# Patient Record
Sex: FEMALE | Race: OTHER | Employment: UNEMPLOYED | URBAN - METROPOLITAN AREA
[De-identification: names, ages, dates, MRNs, and addresses within clinical notes are randomized per-mention and may not be internally consistent; named-entity substitution may affect disease eponyms.]

---

## 2020-01-11 ENCOUNTER — HOSPITAL ENCOUNTER (INPATIENT)
Age: 55
LOS: 3 days | Discharge: NURSING, REHAB OR LONG-TERM CARE | DRG: 463 | End: 2020-01-14
Attending: FAMILY MEDICINE | Admitting: FAMILY MEDICINE
Payer: MEDICARE

## 2020-01-11 PROBLEM — D50.9 IRON DEFICIENCY ANEMIA: Status: ACTIVE | Noted: 2020-01-11

## 2020-01-11 PROBLEM — N12 PYELONEPHRITIS: Status: ACTIVE | Noted: 2020-01-11

## 2020-01-11 PROBLEM — D75.839 THROMBOCYTOSIS: Status: ACTIVE | Noted: 2020-01-11

## 2020-01-11 PROBLEM — A41.9 SEPSIS (HCC): Status: ACTIVE | Noted: 2020-01-11

## 2020-01-11 PROBLEM — N15.1 KIDNEY ABSCESS: Status: ACTIVE | Noted: 2020-01-11

## 2020-01-11 PROBLEM — Z87.891 EX-SMOKER: Status: ACTIVE | Noted: 2020-01-11

## 2020-01-11 PROBLEM — E11.9 TYPE 2 DIABETES MELLITUS WITHOUT COMPLICATION, WITHOUT LONG-TERM CURRENT USE OF INSULIN (HCC): Status: ACTIVE | Noted: 2020-01-11

## 2020-01-11 LAB
ABSOLUTE EOS #: 0.1 K/UL (ref 0–0.44)
ABSOLUTE IMMATURE GRANULOCYTE: 0.05 K/UL (ref 0–0.3)
ABSOLUTE LYMPH #: 2.01 K/UL (ref 1.1–3.7)
ABSOLUTE MONO #: 0.55 K/UL (ref 0.1–1.2)
ALBUMIN SERPL-MCNC: 2.9 G/DL (ref 3.5–5.2)
ALBUMIN/GLOBULIN RATIO: 0.6 (ref 1–2.5)
ALP BLD-CCNC: 85 U/L (ref 35–104)
ALT SERPL-CCNC: 14 U/L (ref 5–33)
ANION GAP SERPL CALCULATED.3IONS-SCNC: 12 MMOL/L (ref 9–17)
AST SERPL-CCNC: 13 U/L
BASOPHILS # BLD: 1 % (ref 0–2)
BASOPHILS ABSOLUTE: 0.04 K/UL (ref 0–0.2)
BILIRUB SERPL-MCNC: 0.35 MG/DL (ref 0.3–1.2)
BUN BLDV-MCNC: 4 MG/DL (ref 6–20)
BUN/CREAT BLD: ABNORMAL (ref 9–20)
CALCIUM IONIZED: 1.16 MMOL/L (ref 1.13–1.33)
CALCIUM SERPL-MCNC: 8.7 MG/DL (ref 8.6–10.4)
CHLORIDE BLD-SCNC: 97 MMOL/L (ref 98–107)
CO2: 27 MMOL/L (ref 20–31)
CREAT SERPL-MCNC: 0.51 MG/DL (ref 0.5–0.9)
DIFFERENTIAL TYPE: ABNORMAL
EOSINOPHILS RELATIVE PERCENT: 2 % (ref 1–4)
GFR AFRICAN AMERICAN: >60 ML/MIN
GFR NON-AFRICAN AMERICAN: >60 ML/MIN
GFR SERPL CREATININE-BSD FRML MDRD: ABNORMAL ML/MIN/{1.73_M2}
GFR SERPL CREATININE-BSD FRML MDRD: ABNORMAL ML/MIN/{1.73_M2}
GLUCOSE BLD-MCNC: 145 MG/DL (ref 65–105)
GLUCOSE BLD-MCNC: 149 MG/DL (ref 70–99)
GLUCOSE BLD-MCNC: 239 MG/DL (ref 65–105)
GLUCOSE BLD-MCNC: 249 MG/DL (ref 65–105)
HCT VFR BLD CALC: 28.5 % (ref 36.3–47.1)
HEMOGLOBIN: 8.7 G/DL (ref 11.9–15.1)
IMMATURE GRANULOCYTES: 1 %
INR BLD: 1.1
IRON SATURATION: 12 % (ref 20–55)
IRON: 27 UG/DL (ref 37–145)
LACTIC ACID, SEPSIS WHOLE BLOOD: 1.1 MMOL/L (ref 0.5–1.9)
LACTIC ACID, SEPSIS WHOLE BLOOD: 1.3 MMOL/L (ref 0.5–1.9)
LACTIC ACID, SEPSIS: NORMAL MMOL/L (ref 0.5–1.9)
LACTIC ACID, SEPSIS: NORMAL MMOL/L (ref 0.5–1.9)
LYMPHOCYTES # BLD: 29 % (ref 24–43)
MAGNESIUM: 1.6 MG/DL (ref 1.6–2.6)
MCH RBC QN AUTO: 25.4 PG (ref 25.2–33.5)
MCHC RBC AUTO-ENTMCNC: 30.5 G/DL (ref 28.4–34.8)
MCV RBC AUTO: 83.1 FL (ref 82.6–102.9)
MONOCYTES # BLD: 8 % (ref 3–12)
NRBC AUTOMATED: 0 PER 100 WBC
PDW BLD-RTO: 15.2 % (ref 11.8–14.4)
PHOSPHORUS: 3.4 MG/DL (ref 2.6–4.5)
PLATELET # BLD: 379 K/UL (ref 138–453)
PLATELET ESTIMATE: ABNORMAL
PMV BLD AUTO: 8.6 FL (ref 8.1–13.5)
POTASSIUM SERPL-SCNC: 3.9 MMOL/L (ref 3.7–5.3)
PROTHROMBIN TIME: 12 SEC (ref 9–12)
RBC # BLD: 3.43 M/UL (ref 3.95–5.11)
RBC # BLD: ABNORMAL 10*6/UL
SEG NEUTROPHILS: 59 % (ref 36–65)
SEGMENTED NEUTROPHILS ABSOLUTE COUNT: 4.1 K/UL (ref 1.5–8.1)
SODIUM BLD-SCNC: 136 MMOL/L (ref 135–144)
TOTAL IRON BINDING CAPACITY: 229 UG/DL (ref 250–450)
TOTAL PROTEIN: 7.9 G/DL (ref 6.4–8.3)
UNSATURATED IRON BINDING CAPACITY: 202 UG/DL (ref 112–347)
WBC # BLD: 6.9 K/UL (ref 3.5–11.3)
WBC # BLD: ABNORMAL 10*3/UL

## 2020-01-11 PROCEDURE — 2060000000 HC ICU INTERMEDIATE R&B

## 2020-01-11 PROCEDURE — 85610 PROTHROMBIN TIME: CPT

## 2020-01-11 PROCEDURE — 2580000003 HC RX 258: Performed by: NURSE PRACTITIONER

## 2020-01-11 PROCEDURE — 85025 COMPLETE CBC W/AUTO DIFF WBC: CPT

## 2020-01-11 PROCEDURE — 6370000000 HC RX 637 (ALT 250 FOR IP): Performed by: NURSE PRACTITIONER

## 2020-01-11 PROCEDURE — 87040 BLOOD CULTURE FOR BACTERIA: CPT

## 2020-01-11 PROCEDURE — 36415 COLL VENOUS BLD VENIPUNCTURE: CPT

## 2020-01-11 PROCEDURE — 80053 COMPREHEN METABOLIC PANEL: CPT

## 2020-01-11 PROCEDURE — 82947 ASSAY GLUCOSE BLOOD QUANT: CPT

## 2020-01-11 PROCEDURE — 99223 1ST HOSP IP/OBS HIGH 75: CPT | Performed by: FAMILY MEDICINE

## 2020-01-11 PROCEDURE — 83550 IRON BINDING TEST: CPT

## 2020-01-11 PROCEDURE — 6360000002 HC RX W HCPCS: Performed by: NURSE PRACTITIONER

## 2020-01-11 PROCEDURE — 84100 ASSAY OF PHOSPHORUS: CPT

## 2020-01-11 PROCEDURE — 83605 ASSAY OF LACTIC ACID: CPT

## 2020-01-11 PROCEDURE — 83735 ASSAY OF MAGNESIUM: CPT

## 2020-01-11 PROCEDURE — 83540 ASSAY OF IRON: CPT

## 2020-01-11 PROCEDURE — 80074 ACUTE HEPATITIS PANEL: CPT

## 2020-01-11 PROCEDURE — 82330 ASSAY OF CALCIUM: CPT

## 2020-01-11 PROCEDURE — 6370000000 HC RX 637 (ALT 250 FOR IP): Performed by: FAMILY MEDICINE

## 2020-01-11 RX ORDER — GABAPENTIN 300 MG/1
300 CAPSULE ORAL
COMMUNITY
Start: 2020-01-08

## 2020-01-11 RX ORDER — BUSPIRONE HYDROCHLORIDE 15 MG/1
15 TABLET ORAL NIGHTLY
Status: DISCONTINUED | OUTPATIENT
Start: 2020-01-11 | End: 2020-01-15 | Stop reason: HOSPADM

## 2020-01-11 RX ORDER — DEXTROSE MONOHYDRATE 50 MG/ML
100 INJECTION, SOLUTION INTRAVENOUS PRN
Status: DISCONTINUED | OUTPATIENT
Start: 2020-01-11 | End: 2020-01-15 | Stop reason: HOSPADM

## 2020-01-11 RX ORDER — PANTOPRAZOLE SODIUM 40 MG/1
40 TABLET, DELAYED RELEASE ORAL
Status: DISCONTINUED | OUTPATIENT
Start: 2020-01-12 | End: 2020-01-15 | Stop reason: HOSPADM

## 2020-01-11 RX ORDER — SODIUM CHLORIDE 0.9 % (FLUSH) 0.9 %
10 SYRINGE (ML) INJECTION EVERY 12 HOURS SCHEDULED
Status: DISCONTINUED | OUTPATIENT
Start: 2020-01-11 | End: 2020-01-15 | Stop reason: HOSPADM

## 2020-01-11 RX ORDER — OMEPRAZOLE 20 MG/1
20 CAPSULE, DELAYED RELEASE ORAL DAILY
COMMUNITY
Start: 2019-09-25

## 2020-01-11 RX ORDER — GABAPENTIN 300 MG/1
300 CAPSULE ORAL 3 TIMES DAILY
Status: DISCONTINUED | OUTPATIENT
Start: 2020-01-11 | End: 2020-01-15 | Stop reason: HOSPADM

## 2020-01-11 RX ORDER — ASPIRIN 81 MG/1
81 TABLET, CHEWABLE ORAL DAILY
Status: DISCONTINUED | OUTPATIENT
Start: 2020-01-11 | End: 2020-01-15 | Stop reason: HOSPADM

## 2020-01-11 RX ORDER — DEXTROSE MONOHYDRATE 25 G/50ML
12.5 INJECTION, SOLUTION INTRAVENOUS PRN
Status: DISCONTINUED | OUTPATIENT
Start: 2020-01-11 | End: 2020-01-15 | Stop reason: HOSPADM

## 2020-01-11 RX ORDER — DEXTROSE MONOHYDRATE 50 MG/ML
100 INJECTION, SOLUTION INTRAVENOUS PRN
Status: DISCONTINUED | OUTPATIENT
Start: 2020-01-11 | End: 2020-01-11 | Stop reason: SDUPTHER

## 2020-01-11 RX ORDER — BUPROPION HYDROCHLORIDE 150 MG/1
150 TABLET, EXTENDED RELEASE ORAL DAILY
Status: DISCONTINUED | OUTPATIENT
Start: 2020-01-11 | End: 2020-01-15 | Stop reason: HOSPADM

## 2020-01-11 RX ORDER — FLUOXETINE HYDROCHLORIDE 20 MG/1
40 CAPSULE ORAL DAILY
Status: DISCONTINUED | OUTPATIENT
Start: 2020-01-11 | End: 2020-01-15 | Stop reason: HOSPADM

## 2020-01-11 RX ORDER — FLUOXETINE HYDROCHLORIDE 40 MG/1
CAPSULE ORAL
COMMUNITY
Start: 2019-08-05

## 2020-01-11 RX ORDER — INSULIN GLARGINE 100 [IU]/ML
20 INJECTION, SOLUTION SUBCUTANEOUS ONCE
Status: COMPLETED | OUTPATIENT
Start: 2020-01-11 | End: 2020-01-11

## 2020-01-11 RX ORDER — INSULIN GLARGINE 100 [IU]/ML
40 INJECTION, SOLUTION SUBCUTANEOUS 2 TIMES DAILY
Status: DISCONTINUED | OUTPATIENT
Start: 2020-01-11 | End: 2020-01-15 | Stop reason: HOSPADM

## 2020-01-11 RX ORDER — LISINOPRIL AND HYDROCHLOROTHIAZIDE 12.5; 1 MG/1; MG/1
1 TABLET ORAL DAILY
Status: DISCONTINUED | OUTPATIENT
Start: 2020-01-11 | End: 2020-01-15 | Stop reason: HOSPADM

## 2020-01-11 RX ORDER — ACETAMINOPHEN 325 MG/1
650 TABLET ORAL EVERY 4 HOURS PRN
Status: DISCONTINUED | OUTPATIENT
Start: 2020-01-11 | End: 2020-01-11

## 2020-01-11 RX ORDER — SODIUM CHLORIDE 0.9 % (FLUSH) 0.9 %
10 SYRINGE (ML) INJECTION PRN
Status: DISCONTINUED | OUTPATIENT
Start: 2020-01-11 | End: 2020-01-15 | Stop reason: HOSPADM

## 2020-01-11 RX ORDER — OXYCODONE HYDROCHLORIDE AND ACETAMINOPHEN 5; 325 MG/1; MG/1
1 TABLET ORAL
COMMUNITY
Start: 2020-01-10

## 2020-01-11 RX ORDER — OXYCODONE HYDROCHLORIDE AND ACETAMINOPHEN 5; 325 MG/1; MG/1
1 TABLET ORAL EVERY 4 HOURS PRN
Status: DISCONTINUED | OUTPATIENT
Start: 2020-01-11 | End: 2020-01-15 | Stop reason: HOSPADM

## 2020-01-11 RX ORDER — AMITRIPTYLINE HYDROCHLORIDE 25 MG/1
25 TABLET, FILM COATED ORAL
COMMUNITY
Start: 2019-12-24 | End: 2020-01-23

## 2020-01-11 RX ORDER — NICOTINE POLACRILEX 4 MG
15 LOZENGE BUCCAL PRN
Status: DISCONTINUED | OUTPATIENT
Start: 2020-01-11 | End: 2020-01-15 | Stop reason: HOSPADM

## 2020-01-11 RX ORDER — AMITRIPTYLINE HYDROCHLORIDE 25 MG/1
25 TABLET, FILM COATED ORAL NIGHTLY
Status: DISCONTINUED | OUTPATIENT
Start: 2020-01-11 | End: 2020-01-15 | Stop reason: HOSPADM

## 2020-01-11 RX ORDER — NICOTINE POLACRILEX 4 MG
15 LOZENGE BUCCAL PRN
Status: DISCONTINUED | OUTPATIENT
Start: 2020-01-11 | End: 2020-01-11 | Stop reason: SDUPTHER

## 2020-01-11 RX ORDER — DEXTROSE MONOHYDRATE 25 G/50ML
12.5 INJECTION, SOLUTION INTRAVENOUS PRN
Status: DISCONTINUED | OUTPATIENT
Start: 2020-01-11 | End: 2020-01-11 | Stop reason: SDUPTHER

## 2020-01-11 RX ORDER — ACETAMINOPHEN 325 MG/1
650 TABLET ORAL
COMMUNITY
Start: 2019-12-31 | End: 2020-01-30

## 2020-01-11 RX ORDER — LANOLIN ALCOHOL/MO/W.PET/CERES
325 CREAM (GRAM) TOPICAL 2 TIMES DAILY WITH MEALS
Status: DISCONTINUED | OUTPATIENT
Start: 2020-01-11 | End: 2020-01-15 | Stop reason: HOSPADM

## 2020-01-11 RX ORDER — SODIUM CHLORIDE 9 MG/ML
INJECTION, SOLUTION INTRAVENOUS CONTINUOUS
Status: DISCONTINUED | OUTPATIENT
Start: 2020-01-11 | End: 2020-01-15 | Stop reason: HOSPADM

## 2020-01-11 RX ORDER — BUSPIRONE HYDROCHLORIDE 15 MG/1
15 TABLET ORAL
Status: ON HOLD | COMMUNITY
Start: 2019-09-25 | End: 2020-01-13 | Stop reason: HOSPADM

## 2020-01-11 RX ORDER — M-VIT,TX,IRON,MINS/CALC/FOLIC 27MG-0.4MG
1 TABLET ORAL
COMMUNITY
Start: 2019-12-11

## 2020-01-11 RX ORDER — OXYCODONE HYDROCHLORIDE AND ACETAMINOPHEN 5; 325 MG/1; MG/1
2 TABLET ORAL EVERY 4 HOURS PRN
Status: DISCONTINUED | OUTPATIENT
Start: 2020-01-11 | End: 2020-01-15 | Stop reason: HOSPADM

## 2020-01-11 RX ORDER — AMOXICILLIN 250 MG
1 CAPSULE ORAL
COMMUNITY
Start: 2019-12-24 | End: 2020-01-23

## 2020-01-11 RX ORDER — ONDANSETRON 4 MG/1
4 TABLET, FILM COATED ORAL
Status: ON HOLD | COMMUNITY
Start: 2020-01-03 | End: 2020-01-13 | Stop reason: HOSPADM

## 2020-01-11 RX ORDER — ASPIRIN 81 MG/1
81 TABLET, CHEWABLE ORAL
COMMUNITY
Start: 2019-12-11

## 2020-01-11 RX ORDER — PRAVASTATIN SODIUM 20 MG
20 TABLET ORAL
COMMUNITY
Start: 2019-12-11

## 2020-01-11 RX ORDER — LISINOPRIL AND HYDROCHLOROTHIAZIDE 12.5; 1 MG/1; MG/1
1 TABLET ORAL
COMMUNITY
Start: 2019-08-05

## 2020-01-11 RX ORDER — PRAVASTATIN SODIUM 20 MG
20 TABLET ORAL NIGHTLY
Status: DISCONTINUED | OUTPATIENT
Start: 2020-01-11 | End: 2020-01-15 | Stop reason: HOSPADM

## 2020-01-11 RX ORDER — BUPROPION HYDROCHLORIDE 150 MG/1
150 TABLET, EXTENDED RELEASE ORAL
COMMUNITY
Start: 2020-01-08

## 2020-01-11 RX ORDER — SENNA AND DOCUSATE SODIUM 50; 8.6 MG/1; MG/1
1 TABLET, FILM COATED ORAL NIGHTLY
Status: DISCONTINUED | OUTPATIENT
Start: 2020-01-11 | End: 2020-01-15 | Stop reason: HOSPADM

## 2020-01-11 RX ORDER — FERROUS SULFATE 325(65) MG
325 TABLET ORAL
COMMUNITY
Start: 2019-10-04

## 2020-01-11 RX ADMIN — LISINOPRIL AND HYDROCHLOROTHIAZIDE 1 TABLET: 12.5; 1 TABLET ORAL at 15:36

## 2020-01-11 RX ADMIN — BUSPIRONE HYDROCHLORIDE 15 MG: 15 TABLET ORAL at 23:35

## 2020-01-11 RX ADMIN — CEFTRIAXONE SODIUM 1 G: 1 INJECTION, POWDER, FOR SOLUTION INTRAMUSCULAR; INTRAVENOUS at 15:59

## 2020-01-11 RX ADMIN — OXYCODONE HYDROCHLORIDE AND ACETAMINOPHEN 2 TABLET: 5; 325 TABLET ORAL at 16:27

## 2020-01-11 RX ADMIN — OXYCODONE HYDROCHLORIDE AND ACETAMINOPHEN 2 TABLET: 5; 325 TABLET ORAL at 20:51

## 2020-01-11 RX ADMIN — SODIUM CHLORIDE, PRESERVATIVE FREE 10 ML: 5 INJECTION INTRAVENOUS at 21:49

## 2020-01-11 RX ADMIN — SODIUM CHLORIDE: 9 INJECTION, SOLUTION INTRAVENOUS at 16:00

## 2020-01-11 RX ADMIN — FERROUS SULFATE TAB EC 325 MG (65 MG FE EQUIVALENT) 325 MG: 325 (65 FE) TABLET DELAYED RESPONSE at 15:36

## 2020-01-11 RX ADMIN — SODIUM CHLORIDE, PRESERVATIVE FREE 10 ML: 5 INJECTION INTRAVENOUS at 15:27

## 2020-01-11 RX ADMIN — BUPROPION HYDROCHLORIDE 150 MG: 150 TABLET, EXTENDED RELEASE ORAL at 23:35

## 2020-01-11 RX ADMIN — INSULIN GLARGINE 20 UNITS: 100 INJECTION, SOLUTION SUBCUTANEOUS at 23:36

## 2020-01-11 RX ADMIN — GABAPENTIN 300 MG: 300 CAPSULE ORAL at 15:36

## 2020-01-11 RX ADMIN — PRAVASTATIN SODIUM 20 MG: 20 TABLET ORAL at 21:49

## 2020-01-11 RX ADMIN — ASPIRIN 81 MG: 81 TABLET, CHEWABLE ORAL at 15:36

## 2020-01-11 RX ADMIN — OXYCODONE HYDROCHLORIDE AND ACETAMINOPHEN 2 TABLET: 5; 325 TABLET ORAL at 12:37

## 2020-01-11 RX ADMIN — GABAPENTIN 300 MG: 300 CAPSULE ORAL at 21:49

## 2020-01-11 RX ADMIN — SENNOSIDES AND DOCUSATE SODIUM 1 TABLET: 8.6; 5 TABLET ORAL at 21:49

## 2020-01-11 RX ADMIN — AMITRIPTYLINE HYDROCHLORIDE 25 MG: 25 TABLET, FILM COATED ORAL at 21:49

## 2020-01-11 RX ADMIN — FLUOXETINE HYDROCHLORIDE 40 MG: 20 CAPSULE ORAL at 15:37

## 2020-01-11 ASSESSMENT — PAIN DESCRIPTION - LOCATION
LOCATION: SHOULDER;FLANK
LOCATION: FLANK;SHOULDER
LOCATION: FLANK

## 2020-01-11 ASSESSMENT — PAIN DESCRIPTION - PROGRESSION: CLINICAL_PROGRESSION: NOT CHANGED

## 2020-01-11 ASSESSMENT — PAIN DESCRIPTION - FREQUENCY: FREQUENCY: CONTINUOUS

## 2020-01-11 ASSESSMENT — PAIN SCALES - GENERAL
PAINLEVEL_OUTOF10: 9
PAINLEVEL_OUTOF10: 8
PAINLEVEL_OUTOF10: 6
PAINLEVEL_OUTOF10: 7

## 2020-01-11 ASSESSMENT — PAIN DESCRIPTION - PAIN TYPE
TYPE: ACUTE PAIN

## 2020-01-11 ASSESSMENT — PAIN - FUNCTIONAL ASSESSMENT: PAIN_FUNCTIONAL_ASSESSMENT: ACTIVITIES ARE NOT PREVENTED

## 2020-01-11 ASSESSMENT — PAIN DESCRIPTION - ONSET: ONSET: ON-GOING

## 2020-01-11 ASSESSMENT — PAIN DESCRIPTION - ORIENTATION
ORIENTATION: RIGHT
ORIENTATION: LEFT;RIGHT
ORIENTATION: LEFT;RIGHT

## 2020-01-11 ASSESSMENT — PAIN DESCRIPTION - DESCRIPTORS: DESCRIPTORS: ACHING

## 2020-01-11 NOTE — H&P
[facility]    HISTORY AND PHYSICAL EXAMINATION            Date:   1/12/2020  Patient name:  Qasim Gonzalez  Date of admission:  1/11/2020 10:56 AM  MRN:   [id number]  Account:  [de-identified]  YOB: 1965  PCP:    Jennie Hernandez DO  Room:   0677/2714-94  Code Status:    Full Code    Chief Complaint:     No chief complaint on file. History Obtained From:     patient, electronic medical record    History of Present Illness:     Qasim Gonzalez is a 62 y.o. Non-/non  female who presents with No chief complaint on file. and is admitted to the hospital for the management of right kidney pyonephritis/abscess. Patient was transferred to us from outside facility where she presented with progressively worsening right-sided flank pain that is constant 10/10 and sharp nonradiating, patient had known recent history of right kidney stone and by nephritis that caused her septic shock and JOSELIN in December 2019 she was admitted to the hospital and was treated with antibiotics with no intervention and discharged on oral antibiotics, patient noted that the pain is worsening, denies fevers and chills, a follow-up appointment with infectious disease doctor next week, had a repeat CT abdomen pelvis at the ER that showed no change in her pyonephritis, ureteral stranding and multiple hypodense lesions inside her right kidney, reviewing ER labs showed anemia, thrombocytosis, elevated INR and hypo-albuminemia, no obvious urine infection in the report. She also complains of left shoulder neck pain  Patient was transferred to our facility for further management. Other than the pain she denies any chest pain, sob, nausea, vomiting, fever or chills  Comorbidities include diabetes, hypertension, hyperlipidemia, depression, GERD. Past Medical History:      Type 2 diabetes, hypertension, hyperlipidemia, depression, GERD.     Past Surgical History: History reviewed. No pertinent surgical history. Medications Prior to Admission:     Prior to Admission medications    Medication Sig Start Date End Date Taking? Authorizing Provider   amitriptyline (ELAVIL) 25 MG tablet Take 25 mg by mouth 12/24/19 1/23/20 Yes Historical Provider, MD   buPROPion [facility] SR) 150 MG extended release tablet Take 150 mg by mouth 1/8/20  Yes Historical Provider, MD   busPIRone (BUSPAR) 15 MG tablet Take 15 mg by mouth 9/25/19  Yes Historical Provider, MD   FLUoxetine (PROZAC) 40 MG capsule take 1 capsule by mouth once daily 8/5/19  Yes Historical Provider, MD   gabapentin (NEURONTIN) 300 MG capsule Take 300 mg by mouth. 1/8/20  Yes Historical Provider, MD   Insulin Glargine, 2 Unit Dial, (TOUJEO MAX SOLOSTAR) 300 UNIT/ML SOPN Inject 80 Units into the skin 12/10/19  Yes Historical Provider, MD   lisinopril-hydrochlorothiazide (PRINZIDE;ZESTORETIC) 10-12.5 MG per tablet Take 1 tablet by mouth 8/5/19  Yes Historical Provider, MD   metFORMIN (GLUCOPHAGE) 1000 MG tablet Take 1,000 mg by mouth 12/11/19  Yes Historical Provider, MD   ondansetron (ZOFRAN) 4 MG tablet Take 4 mg by mouth 1/3/20  Yes Historical Provider, MD   oxyCODONE-acetaminophen (PERCOCET) 5-325 MG per tablet Take 1 tablet by mouth.  1/10/20  Yes Historical Provider, MD   pravastatin (PRAVACHOL) 20 MG tablet Take 20 mg by mouth 12/11/19  Yes Historical Provider, MD   Semaglutide,0.25 or 0.5MG/DOS, 2 MG/1.5ML SOPN Inject 0.25 mg into the skin 12/10/19  Yes Historical Provider, MD   senna-docusate (SENOKOT S) 8.6-50 MG per tablet Take 1 tablet by mouth 12/24/19 1/23/20 Yes Historical Provider, MD   omeprazole (PRILOSEC) 20 MG delayed release capsule Take 20 mg by mouth daily 9/25/19  Yes Historical Provider, MD   ferrous sulfate 325 (65 Fe) MG tablet Take 325 mg by mouth 10/4/19  Yes Historical Provider, MD   Multiple Vitamins-Minerals (THERAPEUTIC MULTIVITAMIN-MINERALS) tablet Take 1 tablet by mouth 12/11/19  Yes Historical Provider, MD   aspirin (ASPIRIN 81) 81 MG chewable tablet Take 81 mg by mouth 12/11/19  Yes Historical Provider, MD   Ascorbic Acid 250 MG CHEW Take 2 tablets by mouth   Yes Historical Provider, MD   acetaminophen (TYLENOL) 325 MG tablet Take 650 mg by mouth 12/31/19 1/30/20 Yes Historical Provider, MD        Allergies:     Patient has no known allergies. Social History:     Tobacco:    reports that she quit smoking about 10 years ago. Her smoking use included cigarettes. She has never used smokeless tobacco.  Alcohol:      has no history on file for alcohol. Drug Use:  has no history on file for drug. Family History:     History reviewed. No pertinent family history. Review of Systems:     Positive and Negative as described in HPI.     CONSTITUTIONAL:  negative for fevers, chills, sweats, +ve fatigue, weight loss  HEENT:  negative for vision, hearing changes, runny nose, throat pain  RESPIRATORY:  negative for shortness of breath, cough, congestion, wheezing  CARDIOVASCULAR:  negative for chest pain, palpitations  GASTROINTESTINAL:   +ve  abdominal pain ( flank pain), negative for nausea, vomiting, diarrhea, constipation, change in bowel habits,   GENITOURINARY:  negative for difficulty of urination, burning with urination, frequency   INTEGUMENT:  negative for rash, skin lesions, easy bruising   HEMATOLOGIC/LYMPHATIC:  negative for swelling/edema   ALLERGIC/IMMUNOLOGIC:  negative for urticaria , itching  ENDOCRINE:  negative increase in drinking, increase in urination, hot or cold intolerance  MUSCULOSKELETAL:  negative joint pains, muscle aches, swelling of joints  NEUROLOGICAL:  negative for headaches, dizziness, lightheadedness, numbness, pain, tingling extremities  BEHAVIOR/PSYCH:  negative for depression, anxiety    Physical Exam:   BP (!) 115/49   Pulse 84   Temp 98.6 °F (37 °C) (Oral)   Resp 14   Ht 5' 3\" (1.6 m)   Wt 126 lb (57.2 kg) Comment: stated by patient  SpO2 96%   BMI 22.32 kg/m²   Temp (24hrs), Av.4 °F (36.9 °C), Min:97.5 °F (36.4 °C), Max:98.9 °F (37.2 °C)    Recent Labs     20  1631 20  2129 20  0931 20  1140   POCGLU 239* 249* 174* 128*       Intake/Output Summary (Last 24 hours) at 2020 1243  Last data filed at 2020 3982  Gross per 24 hour   Intake 1600 ml   Output 710 ml   Net 890 ml       General Appearance: alert, well appearing, and in no acute distress  Mental status: oriented to person, place, and time  Head: normocephalic, atraumatic  Eye: no icterus, redness, pupils equal and reactive, extraocular eye movements intact, conjunctiva clear  Ear: normal external ear, no discharge, hearing intact  Nose: no drainage noted  Mouth: mucous membranes moist  Neck: supple, no carotid bruits, thyroid not palpable  Lungs: Bilateral equal air entry, clear to ausculation, no wheezing, rales or rhonchi, normal effort  Cardiovascular: normal rate, regular rhythm, no murmur, gallop, rub  Abdomen: Soft, nontender, nondistended, normal bowel sounds, no hepatomegaly or splenomegaly  Neurologic: There are no new focal motor or sensory deficits, normal muscle tone and bulk, no abnormal sensation, normal speech, cranial nerves II through XII grossly intact  Skin: No gross lesions, rashes, bruising or bleeding on exposed skin area  Extremities: peripheral pulses palpable, no pedal edema or calf pain with palpation  Psych: normal affect    Investigations:      Laboratory Testing:  Recent Results (from the past 24 hour(s))   Lactate, Sepsis    Collection Time: 20  2:56 PM   Result Value Ref Range    Lactic Acid, Sepsis NOT REPORTED 0.5 - 1.9 mmol/L    Lactic Acid, Sepsis, Whole Blood 1.3 0.5 - 1.9 mmol/L   POC Glucose Fingerstick    Collection Time: 20  4:31 PM   Result Value Ref Range    POC Glucose 239 (H) 65 - 105 mg/dL   IRON AND TIBC    Collection Time: 20  6:18 PM   Result Value Ref Range    Iron 27 (L) 37 - 145 ug/dL    TIBC 229 (L) 250 - 450 ug/dL    Iron Saturation 12 (L) 20 - 55 %    UIBC 202 112 - 347 ug/dL   POC Glucose Fingerstick    Collection Time: 01/11/20  9:29 PM   Result Value Ref Range    POC Glucose 249 (H) 65 - 105 mg/dL   CBC WITH AUTO DIFFERENTIAL    Collection Time: 01/12/20  6:34 AM   Result Value Ref Range    WBC 6.6 3.5 - 11.3 k/uL    RBC 3.19 (L) 3.95 - 5.11 m/uL    Hemoglobin 8.5 (L) 11.9 - 15.1 g/dL    Hematocrit 28.4 (L) 36.3 - 47.1 %    MCV 89.0 82.6 - 102.9 fL    MCH 26.6 25.2 - 33.5 pg    MCHC 29.9 28.4 - 34.8 g/dL    RDW 15.7 (H) 11.8 - 14.4 %    Platelets 552 517 - 604 k/uL    MPV 8.6 8.1 - 13.5 fL    NRBC Automated 0.0 0.0 per 100 WBC    Differential Type NOT REPORTED     WBC Morphology NOT REPORTED     RBC Morphology ANISOCYTOSIS PRESENT     Platelet Estimate NOT REPORTED     Seg Neutrophils 62 36 - 65 %    Lymphocytes 25 24 - 43 %    Monocytes 9 3 - 12 %    Eosinophils % 2 1 - 4 %    Basophils 1 0 - 2 %    Immature Granulocytes 1 (H) 0 %    Segs Absolute 4.13 1.50 - 8.10 k/uL    Absolute Lymph # 1.67 1.10 - 3.70 k/uL    Absolute Mono # 0.62 0.10 - 1.20 k/uL    Absolute Eos # 0.12 0.00 - 0.44 k/uL    Basophils Absolute 0.04 0.00 - 0.20 k/uL    Absolute Immature Granulocyte 0.04 0.00 - 0.30 k/uL   PROTIME-INR    Collection Time: 01/12/20  6:34 AM   Result Value Ref Range    Protime 11.6 9.0 - 12.0 sec    INR 1.1    APTT    Collection Time: 01/12/20  6:34 AM   Result Value Ref Range    PTT 27.5 20.5 - 30.5 sec   POC Glucose Fingerstick    Collection Time: 01/12/20  9:31 AM   Result Value Ref Range    POC Glucose 174 (H) 65 - 105 mg/dL   POC Glucose Fingerstick    Collection Time: 01/12/20 11:40 AM   Result Value Ref Range    POC Glucose 128 (H) 65 - 105 mg/dL       Imaging/Diagnostics:    STUDY: ABDOMEN AND PELVIS CT WITH CONTRAST   1/10 ( [place] )     COMPARISON:  December 26, 2019        TECHNIQUE: CT abdomen and pelvis was performed utilizing 5 mm axial reconstructions following the uneventful administration Check INR  DVT PPx   Continue other select home meds, please review orders         Consultations:   [address] [facility] TO INFECTIOUS DISEASES     Patient is admitted as inpatient status because of co-morbidities listed above, severity of signs and symptoms as outlined, requirement for current medical therapies and most importantly because of direct risk to patient if care not provided in a hospital setting.     Anand Carrillo MD  1/12/2020  12:43 PM    Copy sent to Dr. Nolan Bence, DO

## 2020-01-11 NOTE — PROGRESS NOTES
Physical Therapy  DATE: 2020    NAME: Jeromy Gresham  MRN: [id number]   : 1965    Patient not seen this date for Physical Therapy due to:  [] Blood transfusion in progress  [] Hemodialysis  []  Patient Declined  [] Spine Precautions   [] Strict Bedrest  [] Surgery/ Procedure  [] Testing      [x] Other No physician notes in chart. Very limited history available. Will check . [] PT being discontinued at this time. Patient independent. No further needs. [] PT being discontinued at this time as the patient has been transferred to palliative care. No further needs.     Benny Lund, PT

## 2020-01-11 NOTE — PLAN OF CARE
63 yo female presents to [facility] c/o pain. Patient had presented to the  ER before [facility] with similar symptoms diagnosed with pyelonephritis and was sent home with Augmentin. Workup in ER shows: CT chest wnl, CT abd./pelvis shows inflammatory changes around rt. Kidney. Request transfer to [facility] for further treatment. /85, T37.6c, SPO2 94%.

## 2020-01-12 PROBLEM — R16.0 HEPATOMEGALY: Status: ACTIVE | Noted: 2020-01-12

## 2020-01-12 LAB
ABSOLUTE EOS #: 0.12 K/UL (ref 0–0.44)
ABSOLUTE IMMATURE GRANULOCYTE: 0.04 K/UL (ref 0–0.3)
ABSOLUTE LYMPH #: 1.67 K/UL (ref 1.1–3.7)
ABSOLUTE MONO #: 0.62 K/UL (ref 0.1–1.2)
BASOPHILS # BLD: 1 % (ref 0–2)
BASOPHILS ABSOLUTE: 0.04 K/UL (ref 0–0.2)
DIFFERENTIAL TYPE: ABNORMAL
EOSINOPHILS RELATIVE PERCENT: 2 % (ref 1–4)
GLUCOSE BLD-MCNC: 128 MG/DL (ref 65–105)
GLUCOSE BLD-MCNC: 146 MG/DL (ref 65–105)
GLUCOSE BLD-MCNC: 154 MG/DL (ref 65–105)
GLUCOSE BLD-MCNC: 174 MG/DL (ref 65–105)
HAV IGM SER IA-ACNC: NONREACTIVE
HCT VFR BLD CALC: 28.4 % (ref 36.3–47.1)
HEMOGLOBIN: 8.5 G/DL (ref 11.9–15.1)
HEPATITIS B CORE IGM ANTIBODY: NONREACTIVE
HEPATITIS B SURFACE ANTIGEN: NONREACTIVE
HEPATITIS C ANTIBODY: NONREACTIVE
IMMATURE GRANULOCYTES: 1 %
INR BLD: 1.1
LYMPHOCYTES # BLD: 25 % (ref 24–43)
MCH RBC QN AUTO: 26.6 PG (ref 25.2–33.5)
MCHC RBC AUTO-ENTMCNC: 29.9 G/DL (ref 28.4–34.8)
MCV RBC AUTO: 89 FL (ref 82.6–102.9)
MONOCYTES # BLD: 9 % (ref 3–12)
NRBC AUTOMATED: 0 PER 100 WBC
PARTIAL THROMBOPLASTIN TIME: 27.5 SEC (ref 20.5–30.5)
PDW BLD-RTO: 15.7 % (ref 11.8–14.4)
PLATELET # BLD: 330 K/UL (ref 138–453)
PLATELET ESTIMATE: ABNORMAL
PMV BLD AUTO: 8.6 FL (ref 8.1–13.5)
PROTHROMBIN TIME: 11.6 SEC (ref 9–12)
RBC # BLD: 3.19 M/UL (ref 3.95–5.11)
RBC # BLD: ABNORMAL 10*6/UL
SEG NEUTROPHILS: 62 % (ref 36–65)
SEGMENTED NEUTROPHILS ABSOLUTE COUNT: 4.13 K/UL (ref 1.5–8.1)
WBC # BLD: 6.6 K/UL (ref 3.5–11.3)
WBC # BLD: ABNORMAL 10*3/UL

## 2020-01-12 PROCEDURE — 6370000000 HC RX 637 (ALT 250 FOR IP): Performed by: FAMILY MEDICINE

## 2020-01-12 PROCEDURE — 6370000000 HC RX 637 (ALT 250 FOR IP): Performed by: NURSE PRACTITIONER

## 2020-01-12 PROCEDURE — 97535 SELF CARE MNGMENT TRAINING: CPT

## 2020-01-12 PROCEDURE — 85025 COMPLETE CBC W/AUTO DIFF WBC: CPT

## 2020-01-12 PROCEDURE — 90686 IIV4 VACC NO PRSV 0.5 ML IM: CPT | Performed by: FAMILY MEDICINE

## 2020-01-12 PROCEDURE — 82947 ASSAY GLUCOSE BLOOD QUANT: CPT

## 2020-01-12 PROCEDURE — 97161 PT EVAL LOW COMPLEX 20 MIN: CPT

## 2020-01-12 PROCEDURE — 6360000002 HC RX W HCPCS: Performed by: NURSE PRACTITIONER

## 2020-01-12 PROCEDURE — 97530 THERAPEUTIC ACTIVITIES: CPT

## 2020-01-12 PROCEDURE — 85730 THROMBOPLASTIN TIME PARTIAL: CPT

## 2020-01-12 PROCEDURE — 51798 US URINE CAPACITY MEASURE: CPT

## 2020-01-12 PROCEDURE — 2060000000 HC ICU INTERMEDIATE R&B

## 2020-01-12 PROCEDURE — 99232 SBSQ HOSP IP/OBS MODERATE 35: CPT | Performed by: FAMILY MEDICINE

## 2020-01-12 PROCEDURE — 36415 COLL VENOUS BLD VENIPUNCTURE: CPT

## 2020-01-12 PROCEDURE — 6360000002 HC RX W HCPCS: Performed by: FAMILY MEDICINE

## 2020-01-12 PROCEDURE — 97166 OT EVAL MOD COMPLEX 45 MIN: CPT

## 2020-01-12 PROCEDURE — 2580000003 HC RX 258: Performed by: NURSE PRACTITIONER

## 2020-01-12 PROCEDURE — 85610 PROTHROMBIN TIME: CPT

## 2020-01-12 PROCEDURE — 99254 IP/OBS CNSLTJ NEW/EST MOD 60: CPT | Performed by: INTERNAL MEDICINE

## 2020-01-12 PROCEDURE — G0008 ADMIN INFLUENZA VIRUS VAC: HCPCS | Performed by: FAMILY MEDICINE

## 2020-01-12 RX ADMIN — AMITRIPTYLINE HYDROCHLORIDE 25 MG: 25 TABLET, FILM COATED ORAL at 22:02

## 2020-01-12 RX ADMIN — CEFTRIAXONE SODIUM 1 G: 1 INJECTION, POWDER, FOR SOLUTION INTRAMUSCULAR; INTRAVENOUS at 11:35

## 2020-01-12 RX ADMIN — BUSPIRONE HYDROCHLORIDE 15 MG: 15 TABLET ORAL at 22:02

## 2020-01-12 RX ADMIN — INFLUENZA A VIRUS A/BRISBANE/02/2018 IVR-190 (H1N1) ANTIGEN (PROPIOLACTONE INACTIVATED), INFLUENZA A VIRUS A/KANSAS/14/2017 X-327 (H3N2) ANTIGEN (PROPIOLACTONE INACTIVATED), INFLUENZA B VIRUS B/MARYLAND/15/2016 ANTIGEN (PROPIOLACTONE INACTIVATED), INFLUENZA B VIRUS B/PHUKET/3073/2013 BVR-1B ANTIGEN (PROPIOLACTONE INACTIVATED) 0.5 ML: 15; 15; 15; 15 INJECTION, SUSPENSION INTRAMUSCULAR at 09:26

## 2020-01-12 RX ADMIN — OXYCODONE HYDROCHLORIDE AND ACETAMINOPHEN 2 TABLET: 5; 325 TABLET ORAL at 00:55

## 2020-01-12 RX ADMIN — INSULIN LISPRO 1 UNITS: 100 INJECTION, SOLUTION INTRAVENOUS; SUBCUTANEOUS at 09:33

## 2020-01-12 RX ADMIN — SODIUM CHLORIDE: 9 INJECTION, SOLUTION INTRAVENOUS at 00:30

## 2020-01-12 RX ADMIN — OXYCODONE HYDROCHLORIDE AND ACETAMINOPHEN 2 TABLET: 5; 325 TABLET ORAL at 06:08

## 2020-01-12 RX ADMIN — OXYCODONE HYDROCHLORIDE AND ACETAMINOPHEN 2 TABLET: 5; 325 TABLET ORAL at 14:41

## 2020-01-12 RX ADMIN — PRAVASTATIN SODIUM 20 MG: 20 TABLET ORAL at 22:02

## 2020-01-12 RX ADMIN — GABAPENTIN 300 MG: 300 CAPSULE ORAL at 14:42

## 2020-01-12 RX ADMIN — OXYCODONE HYDROCHLORIDE AND ACETAMINOPHEN 2 TABLET: 5; 325 TABLET ORAL at 10:28

## 2020-01-12 RX ADMIN — SODIUM CHLORIDE, PRESERVATIVE FREE 10 ML: 5 INJECTION INTRAVENOUS at 22:02

## 2020-01-12 RX ADMIN — SENNOSIDES AND DOCUSATE SODIUM 1 TABLET: 8.6; 5 TABLET ORAL at 22:02

## 2020-01-12 RX ADMIN — OXYCODONE HYDROCHLORIDE AND ACETAMINOPHEN 2 TABLET: 5; 325 TABLET ORAL at 23:18

## 2020-01-12 RX ADMIN — INSULIN LISPRO 1 UNITS: 100 INJECTION, SOLUTION INTRAVENOUS; SUBCUTANEOUS at 22:00

## 2020-01-12 RX ADMIN — OXYCODONE HYDROCHLORIDE AND ACETAMINOPHEN 2 TABLET: 5; 325 TABLET ORAL at 18:41

## 2020-01-12 RX ADMIN — INSULIN GLARGINE 40 UNITS: 100 INJECTION, SOLUTION SUBCUTANEOUS at 22:01

## 2020-01-12 RX ADMIN — BUPROPION HYDROCHLORIDE 150 MG: 150 TABLET, EXTENDED RELEASE ORAL at 09:26

## 2020-01-12 RX ADMIN — FERROUS SULFATE TAB EC 325 MG (65 MG FE EQUIVALENT) 325 MG: 325 (65 FE) TABLET DELAYED RESPONSE at 09:16

## 2020-01-12 RX ADMIN — GABAPENTIN 300 MG: 300 CAPSULE ORAL at 09:16

## 2020-01-12 RX ADMIN — INSULIN GLARGINE 40 UNITS: 100 INJECTION, SOLUTION SUBCUTANEOUS at 09:16

## 2020-01-12 RX ADMIN — FERROUS SULFATE TAB EC 325 MG (65 MG FE EQUIVALENT) 325 MG: 325 (65 FE) TABLET DELAYED RESPONSE at 22:02

## 2020-01-12 RX ADMIN — GABAPENTIN 300 MG: 300 CAPSULE ORAL at 22:02

## 2020-01-12 RX ADMIN — PANTOPRAZOLE SODIUM 40 MG: 40 TABLET, DELAYED RELEASE ORAL at 09:15

## 2020-01-12 RX ADMIN — FLUOXETINE HYDROCHLORIDE 40 MG: 20 CAPSULE ORAL at 09:16

## 2020-01-12 ASSESSMENT — PAIN - FUNCTIONAL ASSESSMENT
PAIN_FUNCTIONAL_ASSESSMENT: ACTIVITIES ARE NOT PREVENTED
PAIN_FUNCTIONAL_ASSESSMENT: ACTIVITIES ARE NOT PREVENTED

## 2020-01-12 ASSESSMENT — PAIN SCALES - GENERAL
PAINLEVEL_OUTOF10: 5
PAINLEVEL_OUTOF10: 5
PAINLEVEL_OUTOF10: 7
PAINLEVEL_OUTOF10: 9
PAINLEVEL_OUTOF10: 7
PAINLEVEL_OUTOF10: 0
PAINLEVEL_OUTOF10: 9
PAINLEVEL_OUTOF10: 7
PAINLEVEL_OUTOF10: 8

## 2020-01-12 ASSESSMENT — PAIN DESCRIPTION - PROGRESSION
CLINICAL_PROGRESSION: NOT CHANGED
CLINICAL_PROGRESSION: NOT CHANGED

## 2020-01-12 ASSESSMENT — PAIN DESCRIPTION - LOCATION
LOCATION: SHOULDER;NECK
LOCATION: NECK;SHOULDER
LOCATION: FLANK;SHOULDER
LOCATION: FLANK;SHOULDER

## 2020-01-12 ASSESSMENT — PAIN DESCRIPTION - ORIENTATION
ORIENTATION: LEFT
ORIENTATION: LEFT;RIGHT
ORIENTATION: LEFT
ORIENTATION: RIGHT;LEFT

## 2020-01-12 ASSESSMENT — PAIN DESCRIPTION - FREQUENCY
FREQUENCY: CONTINUOUS

## 2020-01-12 ASSESSMENT — PAIN DESCRIPTION - DESCRIPTORS
DESCRIPTORS: ACHING

## 2020-01-12 ASSESSMENT — PAIN DESCRIPTION - ONSET
ONSET: ON-GOING
ONSET: ON-GOING

## 2020-01-12 ASSESSMENT — PAIN DESCRIPTION - PAIN TYPE
TYPE: ACUTE PAIN

## 2020-01-12 NOTE — CONSULTS
Celina Aceves MD, Gregory Mills MD, Anuj Soto MD, Leonie Patterson MD, Hannah De Leon MD, Shanta Deleon MD, & Prescott Duane, MD    Urology Consultation      Patient:  Karena Villegas  MRN: [id number]  YOB: 1965    REASON FOR CONSULTATION:  Renal abscess     HISTORY OF PRESENT ILLNESS:   Karena Villegas is a 62 y.o. female with significant history of type 2 diabetes mellitus who urology was consulted to evaluate for renal abscess. Patient was seen at [facility] ER after presenting with shortness of breath and right flank pain as well as decreased urine output. She was tachycardic and started on Zosyn for suspected UTI. CT of the abdomen/pelvis showed a right renal abscess. Patient has a known right renal abscess that was 1st diagnosed as a 5 cm phlegmon on 12/17/2019. She was seen by Urology at that time and it was determined that a an IR drain would be unlikely to help infectious Disease was consulted and started the patient on Rocephin and continue this with outpatient course of Bactrim through 01/01/2020. Patient did show evidence of clinical improvement. Urine culture and blood cultures at that time grew E coli resistant to fluoroquinolones. She was subsequently seen by our service at [facility] when she was admitted from [facility] ER on 12/26 with similar presenting symptoms and found to have a more organized 7.3 cm process in the right kidney, however on evaluation IR but did not believe that this area could be drained and so a drain was not placed. Patient was again seen by infectious disease in was on vancomycin / cefepime in the hospital and transition to Augmentin prior to discharge with end date of 01/16/2019. Urine and blood cultures from this prior admission were negative. She was supposed to follow-up with both Infectious Disease and Urology with repeat imaging. Patient currently notes right lateral abdominal pain. No fevers, dysuria, or hematuria.        Patient's old mg, 25 mg, Oral, Nightly, Willie Gates MD    [Held by provider] aspirin chewable tablet 81 mg, 81 mg, Oral, Daily, Willie Gates MD, 81 mg at 01/11/20 1536    FLUoxetine (PROZAC) capsule 40 mg, 40 mg, Oral, Daily, Willie Gates MD, 40 mg at 01/11/20 1537    lisinopril-hydrochlorothiazide (PRINZIDE;ZESTORETIC) 10-12.5 MG per tablet 1 tablet, 1 tablet, Oral, Daily, Willie Gates MD, 1 tablet at 01/11/20 1536    pravastatin (PRAVACHOL) tablet 20 mg, 20 mg, Oral, Nightly, Willie Gates MD    sennosides-docusate sodium (SENOKOT-S) 8.6-50 MG tablet 1 tablet, 1 tablet, Oral, Nightly, Willie Gates MD    gabapentin (NEURONTIN) capsule 300 mg, 300 mg, Oral, TID, Willie Gatse MD, 300 mg at 01/11/20 1536    ferrous sulfate EC tablet 325 mg, 325 mg, Oral, BID WC, Willie Gates MD, 325 mg at 01/11/20 1536    [START ON 1/12/2020] influenza quadrivalent split vaccine (FLUZONE;FLUARIX;FLULAVAL;AFLURIA) injection 0.5 mL, 0.5 mL, Intramuscular, Once, Willie Gates MD    Allergies:  No Known Allergies    Social History:   Social History     Socioeconomic History    Marital status: Unknown     Spouse name: Not on file    Number of children: Not on file    Years of education: Not on file    Highest education level: Not on file   Occupational History    Not on file   Social Needs    Financial resource strain: Not on file    Food insecurity:     Worry: Not on file     Inability: Not on file    Transportation needs:     Medical: Not on file     Non-medical: Not on file   Tobacco Use    Smoking status: Former Smoker     Types: Cigarettes     Last attempt to quit: 1/11/2010     Years since quitting: 10.0    Smokeless tobacco: Never Used   Substance and Sexual Activity    Alcohol use: Not on file    Drug use: Not on file    Sexual activity: Not on file   Lifestyle    Physical activity:     Days per week: Not on file     Minutes per session: Not on file    Stress: Not on file   Relationships    Social connections:     Talks on phone: Not on file     Gets together: Not on file     Attends L[employer]-day service: Not on file     Active member of club or organization: Not on file     Attends meetings of clubs or organizations: Not on file     Relationship status: Not on file    Intimate partner violence:     Fear of current or ex partner: Not on file     Emotionally abused: Not on file     Physically abused: Not on file     Forced sexual activity: Not on file   Other Topics Concern    Not on file   Social History Narrative    Not on file       Family History:  History reviewed. No pertinent family history. REVIEW OF SYSTEMS:  A comprehensive 14 point review of systems was obtained. Constitutional: No fatigue  Eyes: No blurry vision  Ears, nose, mouth, throat, face: No ringing in the ears; no facial droop. Respiratory: No cough or cold. Cardiovascular: No palpitations  Gastrointestinal: No diarrhea or constipation. Genitourinary: No burning with urination  Integument/Skin: No rashes  Hematologic/Lymphatic: No easy bruising  Musculoskeletal: No muscle pains  Neurologic: No weakness in the extremities. Psychiatric: No depression or suicidal thoughts. Endocrine: No heat or cold intolerances. Allergic/Immunologic: No current seasonal allergies; no skin hives. Physical Exam:      Vitals:    01/11/20 2126   BP: (!) 122/57   Pulse: 87   Resp: 21   Temp: 98.5 °F (36.9 °C)   SpO2: 93%     Constitutional: Patient in no acute distress. Neuro: alert and oriented to person place and time. Head: Atraumatic and normocephalic. Neck: Trachea midline   Ext: 2+ radial pulses bilaterally. Psych: Mood and affect normal.  Skin: No rashes or bruising present. Lungs: Respiratory effort normal.  Cardiovascular:  Regular rhythm. Abdomen: Soft, non-tender, non-distended. Right side has CVA tenderness on exam.  Bladder non-tender and not distended. Lymphatics: no palpable lymphadenopathy. Pelvic exam: deferred.   Rectal

## 2020-01-12 NOTE — PLAN OF CARE
Pain level assessment complete. Pt rated pain at 7/10. Pt educated on pain scale and control interventions. PRN pain medication given per pt request. Pt instructed to call out with new onset of pain or unrelieved pain. Will continue to monitor. Patient remained free from injury. Patient verbalized understanding of need for the safety precautions. Demonstrates proper use of assistive devices. Bed remains in the lowest position. Call light remains within reach. [facility] Program in use.      Problem: Falls - Risk of:  Goal: Will remain free from falls  Description  Will remain free from falls  1/12/2020 0412 by Marleen Alcocer RN  Outcome: Met This Shift  1/11/2020 1749 by Libia Fuentes RN  Outcome: Ongoing  Goal: Absence of physical injury  Description  Absence of physical injury  1/12/2020 0412 by Marleen Alcocer RN  Outcome: Met This Shift  1/11/2020 1749 by Libia Fuentes RN  Outcome: Ongoing     Problem: Pain:  Goal: Pain level will decrease  Description  Pain level will decrease  1/12/2020 0412 by Marleen Alcocer RN  Outcome: Ongoing  1/11/2020 1749 by Libia Fuentes RN  Outcome: Ongoing  Goal: Control of acute pain  Description  Control of acute pain  1/12/2020 0412 by Marleen Alcocer RN  Outcome: Ongoing  1/11/2020 1749 by Libia Fuentes RN  Outcome: Ongoing  Goal: Control of chronic pain  Description  Control of chronic pain  1/12/2020 0412 by Marleen Alcocer RN  Outcome: Ongoing  1/11/2020 1749 by Libia Fuentes RN  Outcome: Ongoing

## 2020-01-12 NOTE — PROGRESS NOTES
[facility]    Progress Note    1/12/2020    12:40 PM    Name:   Regina Talamantes  MRN:     [id number]     Acct:      [de-identified]   Room:   [address] [place] [address] Day:  1  Admit Date:  1/11/2020 10:56 AM    PCP:   Bridger Woodruff DO  Code Status:  Full Code    Subjective:     C/C:   Right flank pain    Interval History Status: improved. Patient seen and examined at bedside, no acute events overnight. Feeling better and the pain is controlled. By urology resident yesterday, plan is not clear yet, she was kept n.p.o. overnight per the resident  Not seen by ID  Patient denies any chest pain, shortness of breath, chills, fevers, nausea or vomiting. Patient vitals, labs and all providers notes were reviewed,from overnight shift and morning updates were noted and discussed with the nurse    Brief History:     Regian Talamantes is a 62 y.o. Non-/non  female who presents with No chief complaint on file. and is admitted to the hospital for the management of right kidney pyonephritis/abscess. Patient was transferred to us from outside facility where she presented with progressively worsening right-sided flank pain that is constant 10/10 and sharp nonradiating, patient had known recent history of right kidney stone and by nephritis that caused her septic shock and JOSELIN in December 2019 she was admitted to the hospital and was treated with antibiotics with no intervention and discharged on oral antibiotics, patient noted that the pain is worsening, denies fevers and chills, a follow-up appointment with infectious disease doctor next week, had a repeat CT abdomen pelvis at the ER that showed no change in her pyonephritis, ureteral stranding and multiple hypodense lesions inside her right kidney, reviewing ER labs showed anemia, thrombocytosis, elevated INR and hypo-albuminemia, no obvious urine infection in the report.   She also complains of left

## 2020-01-12 NOTE — PROGRESS NOTES
Stand: Contact guard assistance  Stand to sit: Stand by assistance  Ambulation  Ambulation?: Yes  Ambulation 1  Surface: level tile  Device: Rolling Walker  Assistance: Stand by assistance  Gait Deviations: Slow Romana;Decreased step length  Distance: 300ft  Comments: Pt fatigued after ambulation. Stairs/Curb  Stairs?: No     Balance  Sitting - Static: Good  Sitting - Dynamic: Good  Standing - Static: Good  Standing - Dynamic: Good;-  Comments: Standing balance with rolling walker        Plan   Plan  Times per week: 5x/week  Current Treatment Recommendations: Strengthening, Gait Training, Stair training, Balance Training, Functional Mobility Training, Endurance Training, Home Exercise Program, Transfer Training, Safety Education & Training  Safety Devices  Type of devices: Gait belt, Left sitting EOB , Nurse notified, Call light within reach    AM-PAC Score  AM-PAC Inpatient Mobility Raw Score : 18 (01/12/20 1210)  AM-PAC Inpatient T-Scale Score : 43.63 (01/12/20 1210)  Mobility Inpatient CMS 0-100% Score: 46.58 (01/12/20 1210)  Mobility Inpatient CMS G-Code Modifier : CK (01/12/20 1210)    Goals  Short term goals  Time Frame for Short term goals: 8 visits  Short term goal 1: Independent transfers  Short term goal 2: Independent ambulation with least restrictive device 400 ft  Short term goal 3: Navigate 3 stairs with 1 rail SBA  Short term goal 4: Pt to tolerate 30 minutes of activity to improve endurance. Short term goal 5: Pt to be independent with Home exercise program.  Patient Goals   Patient goals : Feel stronger and gain more endurance.        Therapy Time   Individual Concurrent Group Co-treatment   Time In 1030         Time Out 1052         Minutes 22         Timed Code Treatment Minutes: [facility],

## 2020-01-13 LAB
ABSOLUTE EOS #: 0.15 K/UL (ref 0–0.44)
ABSOLUTE IMMATURE GRANULOCYTE: 0.03 K/UL (ref 0–0.3)
ABSOLUTE LYMPH #: 2.09 K/UL (ref 1.1–3.7)
ABSOLUTE MONO #: 0.54 K/UL (ref 0.1–1.2)
BASOPHILS # BLD: 1 % (ref 0–2)
BASOPHILS ABSOLUTE: 0.05 K/UL (ref 0–0.2)
DIFFERENTIAL TYPE: ABNORMAL
EOSINOPHILS RELATIVE PERCENT: 2 % (ref 1–4)
GLUCOSE BLD-MCNC: 126 MG/DL (ref 65–105)
GLUCOSE BLD-MCNC: 150 MG/DL (ref 65–105)
GLUCOSE BLD-MCNC: 163 MG/DL (ref 65–105)
GLUCOSE BLD-MCNC: 178 MG/DL (ref 65–105)
HCT VFR BLD CALC: 28.6 % (ref 36.3–47.1)
HEMOGLOBIN: 8.4 G/DL (ref 11.9–15.1)
IMMATURE GRANULOCYTES: 1 %
INTERVENTION: NORMAL
LYMPHOCYTES # BLD: 32 % (ref 24–43)
MCH RBC QN AUTO: 25.2 PG (ref 25.2–33.5)
MCHC RBC AUTO-ENTMCNC: 29.4 G/DL (ref 28.4–34.8)
MCV RBC AUTO: 85.9 FL (ref 82.6–102.9)
MONOCYTES # BLD: 8 % (ref 3–12)
NRBC AUTOMATED: 0 PER 100 WBC
PDW BLD-RTO: 15.2 % (ref 11.8–14.4)
PLATELET # BLD: 368 K/UL (ref 138–453)
PLATELET ESTIMATE: ABNORMAL
PMV BLD AUTO: 8.7 FL (ref 8.1–13.5)
RBC # BLD: 3.33 M/UL (ref 3.95–5.11)
RBC # BLD: ABNORMAL 10*6/UL
SEG NEUTROPHILS: 56 % (ref 36–65)
SEGMENTED NEUTROPHILS ABSOLUTE COUNT: 3.63 K/UL (ref 1.5–8.1)
WBC # BLD: 6.5 K/UL (ref 3.5–11.3)
WBC # BLD: ABNORMAL 10*3/UL

## 2020-01-13 PROCEDURE — 2580000003 HC RX 258: Performed by: INTERNAL MEDICINE

## 2020-01-13 PROCEDURE — 82947 ASSAY GLUCOSE BLOOD QUANT: CPT

## 2020-01-13 PROCEDURE — 6370000000 HC RX 637 (ALT 250 FOR IP): Performed by: NURSE PRACTITIONER

## 2020-01-13 PROCEDURE — 6370000000 HC RX 637 (ALT 250 FOR IP): Performed by: FAMILY MEDICINE

## 2020-01-13 PROCEDURE — 2580000003 HC RX 258: Performed by: NURSE PRACTITIONER

## 2020-01-13 PROCEDURE — 99232 SBSQ HOSP IP/OBS MODERATE 35: CPT | Performed by: FAMILY MEDICINE

## 2020-01-13 PROCEDURE — 99232 SBSQ HOSP IP/OBS MODERATE 35: CPT | Performed by: INTERNAL MEDICINE

## 2020-01-13 PROCEDURE — 1200000000 HC SEMI PRIVATE

## 2020-01-13 PROCEDURE — 85025 COMPLETE CBC W/AUTO DIFF WBC: CPT

## 2020-01-13 PROCEDURE — 36415 COLL VENOUS BLD VENIPUNCTURE: CPT

## 2020-01-13 PROCEDURE — 6360000002 HC RX W HCPCS: Performed by: NURSE PRACTITIONER

## 2020-01-13 PROCEDURE — 6360000002 HC RX W HCPCS: Performed by: INTERNAL MEDICINE

## 2020-01-13 RX ORDER — OXYCODONE HYDROCHLORIDE AND ACETAMINOPHEN 5; 325 MG/1; MG/1
1 TABLET ORAL EVERY 8 HOURS PRN
Qty: 9 TABLET | Refills: 0
Start: 2020-01-13 | End: 2020-01-16

## 2020-01-13 RX ADMIN — INSULIN LISPRO 1 UNITS: 100 INJECTION, SOLUTION INTRAVENOUS; SUBCUTANEOUS at 08:47

## 2020-01-13 RX ADMIN — INSULIN GLARGINE 40 UNITS: 100 INJECTION, SOLUTION SUBCUTANEOUS at 20:24

## 2020-01-13 RX ADMIN — GABAPENTIN 300 MG: 300 CAPSULE ORAL at 16:32

## 2020-01-13 RX ADMIN — GABAPENTIN 300 MG: 300 CAPSULE ORAL at 08:31

## 2020-01-13 RX ADMIN — SENNOSIDES AND DOCUSATE SODIUM 1 TABLET: 8.6; 5 TABLET ORAL at 20:17

## 2020-01-13 RX ADMIN — OXYCODONE HYDROCHLORIDE AND ACETAMINOPHEN 2 TABLET: 5; 325 TABLET ORAL at 08:34

## 2020-01-13 RX ADMIN — BUPROPION HYDROCHLORIDE 150 MG: 150 TABLET, EXTENDED RELEASE ORAL at 08:31

## 2020-01-13 RX ADMIN — CEFTRIAXONE SODIUM 1 G: 1 INJECTION, POWDER, FOR SOLUTION INTRAMUSCULAR; INTRAVENOUS at 11:30

## 2020-01-13 RX ADMIN — SODIUM CHLORIDE, PRESERVATIVE FREE 10 ML: 5 INJECTION INTRAVENOUS at 20:17

## 2020-01-13 RX ADMIN — LISINOPRIL AND HYDROCHLOROTHIAZIDE 1 TABLET: 12.5; 1 TABLET ORAL at 08:33

## 2020-01-13 RX ADMIN — OXYCODONE HYDROCHLORIDE AND ACETAMINOPHEN 2 TABLET: 5; 325 TABLET ORAL at 13:24

## 2020-01-13 RX ADMIN — AMITRIPTYLINE HYDROCHLORIDE 25 MG: 25 TABLET, FILM COATED ORAL at 20:18

## 2020-01-13 RX ADMIN — INSULIN GLARGINE 40 UNITS: 100 INJECTION, SOLUTION SUBCUTANEOUS at 08:46

## 2020-01-13 RX ADMIN — GABAPENTIN 300 MG: 300 CAPSULE ORAL at 20:18

## 2020-01-13 RX ADMIN — OXYCODONE HYDROCHLORIDE AND ACETAMINOPHEN 2 TABLET: 5; 325 TABLET ORAL at 04:11

## 2020-01-13 RX ADMIN — ASPIRIN 81 MG: 81 TABLET, CHEWABLE ORAL at 08:51

## 2020-01-13 RX ADMIN — FERROUS SULFATE TAB EC 325 MG (65 MG FE EQUIVALENT) 325 MG: 325 (65 FE) TABLET DELAYED RESPONSE at 08:32

## 2020-01-13 RX ADMIN — FERROUS SULFATE TAB EC 325 MG (65 MG FE EQUIVALENT) 325 MG: 325 (65 FE) TABLET DELAYED RESPONSE at 16:32

## 2020-01-13 RX ADMIN — ENOXAPARIN SODIUM 40 MG: 40 INJECTION SUBCUTANEOUS at 08:51

## 2020-01-13 RX ADMIN — PRAVASTATIN SODIUM 20 MG: 20 TABLET ORAL at 20:18

## 2020-01-13 RX ADMIN — FLUOXETINE HYDROCHLORIDE 40 MG: 20 CAPSULE ORAL at 08:31

## 2020-01-13 RX ADMIN — INSULIN LISPRO 1 UNITS: 100 INJECTION, SOLUTION INTRAVENOUS; SUBCUTANEOUS at 16:41

## 2020-01-13 RX ADMIN — PANTOPRAZOLE SODIUM 40 MG: 40 TABLET, DELAYED RELEASE ORAL at 08:31

## 2020-01-13 RX ADMIN — OXYCODONE HYDROCHLORIDE AND ACETAMINOPHEN 2 TABLET: 5; 325 TABLET ORAL at 21:50

## 2020-01-13 RX ADMIN — BUSPIRONE HYDROCHLORIDE 15 MG: 15 TABLET ORAL at 20:18

## 2020-01-13 RX ADMIN — OXYCODONE HYDROCHLORIDE AND ACETAMINOPHEN 2 TABLET: 5; 325 TABLET ORAL at 17:33

## 2020-01-13 RX ADMIN — SODIUM CHLORIDE, PRESERVATIVE FREE 10 ML: 5 INJECTION INTRAVENOUS at 09:07

## 2020-01-13 ASSESSMENT — PAIN DESCRIPTION - FREQUENCY
FREQUENCY: CONTINUOUS
FREQUENCY: CONTINUOUS

## 2020-01-13 ASSESSMENT — PAIN DESCRIPTION - ORIENTATION
ORIENTATION: LEFT
ORIENTATION: LEFT

## 2020-01-13 ASSESSMENT — PAIN DESCRIPTION - PAIN TYPE
TYPE: ACUTE PAIN

## 2020-01-13 ASSESSMENT — PAIN SCALES - GENERAL
PAINLEVEL_OUTOF10: 8
PAINLEVEL_OUTOF10: 7
PAINLEVEL_OUTOF10: 0
PAINLEVEL_OUTOF10: 7

## 2020-01-13 ASSESSMENT — PAIN DESCRIPTION - DESCRIPTORS
DESCRIPTORS: ACHING
DESCRIPTORS: ACHING

## 2020-01-13 ASSESSMENT — PAIN DESCRIPTION - LOCATION
LOCATION: SHOULDER
LOCATION: SHOULDER
LOCATION: NECK;SHOULDER;BACK

## 2020-01-13 ASSESSMENT — PAIN DESCRIPTION - ONSET
ONSET: ON-GOING
ONSET: ON-GOING

## 2020-01-13 ASSESSMENT — PAIN DESCRIPTION - PROGRESSION: CLINICAL_PROGRESSION: NOT CHANGED

## 2020-01-13 NOTE — PLAN OF CARE
Patient remained free from injury. Patient verbalized understanding of need for the safety precautions. Demonstrates proper use of assistive devices. Bed remains in the lowest position. Call light remains within reach. [facility] Program in use. Pain level assessment complete. Pt rated pain at 7/10. Pt educated on pain scale and control interventions. PRN pain medication given per pt request. Pt instructed to call out with new onset of pain or unrelieved pain. Will continue to monitor.      Problem: Falls - Risk of:  Goal: Will remain free from falls  Description  Will remain free from falls  1/13/2020 0445 by Brinda Torres RN  Outcome: Met This Shift  1/12/2020 1625 by Jerardo Rose RN  Outcome: Ongoing  Goal: Absence of physical injury  Description  Absence of physical injury  1/13/2020 0445 by Brinda Torres RN  Outcome: Met This Shift  1/12/2020 1625 by Jerardo Rose RN  Outcome: Ongoing     Problem: Pain:  Goal: Pain level will decrease  Description  Pain level will decrease  1/13/2020 0445 by Brinda Torres RN  Outcome: Met This Shift  1/12/2020 1625 by Jerardo Rose RN  Outcome: Ongoing  Goal: Control of acute pain  Description  Control of acute pain  1/13/2020 0445 by Brinda Torres RN  Outcome: Met This Shift  1/12/2020 1625 by Jerardo Rose RN  Outcome: Ongoing  Goal: Control of chronic pain  Description  Control of chronic pain  1/13/2020 0445 by Brinda Torres RN  Outcome: Met This Shift  1/12/2020 1625 by Jerardo Rose RN  Outcome: Ongoing

## 2020-01-13 NOTE — PROGRESS NOTES
Physical Therapy  DATE: 2020    NAME: Kaycee Hodge  MRN: [id number]   : 1965    Patient not seen this date for Physical Therapy due to:  [] Blood transfusion in progress  [] Hemodialysis  []  Patient Declined  [] Spine Precautions   [] Strict Bedrest  [] Surgery/ Procedure  [] Testing      [x] Other; Pt transferred to [address] [place] [address] unit. [] PT being discontinued at this time. Patient independent. No further needs. [] PT being discontinued at this time as the patient has been transferred to palliative care. No further needs.     Clare Rodrigez, PTA

## 2020-01-13 NOTE — PROGRESS NOTES
Smoking Cessation - topics covered   []  Health Risks  []  Benefits of Quitting   []  Smoking Cessation  []  Patient has no history of tobacco use per note in significant history. [x]  Patient is former smoker per note in significant history. Patient quit in 2010. [x]  No need for tobacco cessation education. []  Booklet given  []  Patient verbalizes understanding. []  Patient denies need for tobacco cessation education. []  Unable to meet with patient today. Will follow up as able.   Davis Wilder  10:43 AM

## 2020-01-13 NOTE — PROGRESS NOTES
[facility] - Progress Note  Today's Date and Time: 1/13/2020, 4:27 PM    Impression :   · Rt side pyelonephritis  · Possible Rt kidney intraparenchymal abscesses    Recommendations:   · Ceftriaxone 1 gm IV q 24 hr for another 2-3 weeks - stop date 2-2-20  · Monitor progression of Rt kidney by repeat CT in 2 weeks  · Will discuss with Urology. I don't see a single abscess or a perinephric abscess that could be drained. I fear that surgical intervention would require partial or total nephrectomy. · Pt does not have insurance and lives in [place]. She is unable to travel to [place] daily for infusions through the infusion center. She is also concerned that she will not be able to get a follow up CT scan because of her lack of insurance. Medical Decision Making/Summary/Discussion:1/13/2020     ·   Infection Control Recommendations   · [place] Precautions    Antimicrobial Stewardship Recommendations     · Targeted therapy  Coordination of Outpatient Care:   · Estimated Length of IV antimicrobials:2-3 weeks Stop date 2-2-20  · Patient will need Midline Catheter Insertion: Yes  · Patient will need PICC line Insertion:No  · Patient will need: Home IV , [place],  SNF,  LTAC:Yes  · Patient will need outpatient wound care:No    Chief complaint/reason for consultation:   · Complicated Rt pyelonephritis      History of Present Illness:   Karena Villegas is a 62y.o.-year-old  female who was initially admitted on 1/11/2020. Patient seen at the request of Georgi Capellan. INITIAL HISTORY:    Patient initially presented to [facility] ER with increase in pain in the right flank, left shoulder,neck and head. She had previously been seen in ER there on 12/17/2019 because of SOB and Rt side flank pain presumably from a kidney stone. There was concern for possible septic shock, pyelonephritis, acute renal failure and perinephric abcsess.  Patient was transferred to [facility].   Her urine and blood cultures on 12-17-19 x 2 yielded E coli susceptible to ampicillin. She was treated at [facility] from 12-18 through 12-24-19 for pyelonephritis, perinephric abscess, DM 2, E coli septicemia. She received ceftriaxone and was discharged on Bactrim through 1-120. She was evaluated by Dr Tereza Schneider for ID. Urology evaluation was also obtained. They indicated that the patient was not a good candidate for drainage of the Rt renal abscesses. Apparently was seen again on 12/26 not feeling well and was transferred to [facility] where she was treated until 12-31-19. This time she was treated with Zosyn. IR felt that the renal abscess was amenable to IR drainage. Dr Desiree Suresh of ID saw her. The patient was discharged on po Augmentin through 1-16-20. She had cardiac work up for Lt chest pain, shoulder pain. Patient returned to ER in [place] on 1-10-20  with chills at night, increase in pain, shortness of breath of one day duration. She was unable to ambulate into the hospital and needed a wheel chair. Her daughter was also concerned that patient was more confused. Patient was transferred to [facility] at that time. CURRENT EVALUATION : 1/13/2020    Afebrile  VS stable    Patient is feeling better. She denies urgency, dysuria, hematuria  She reports that she is having some Lt shoulder pain today    She went to ER because of intermittent pain in the Rt flank, occasional fevers and felling cold. CT from [facility] 1-10-20 reviewed and compared to CT 12-26-19. There is swelling of Rt kidney which has decreased compared to 12-26-19 scan. I don't appreciate a perinephric abscess, rather she seems to have several intraparenchymal areas with altered attenuation indicative of residual inflammation/ possibly intraparenchymal abscesses. In comparing the films there is no evolution towards formation of a single abscess. Rather there is improvement in the overall degree of swelling.     Pt will BID WC    buPROPion  150 mg Oral Daily    busPIRone  15 mg Oral Nightly       Social History:     Social History     Socioeconomic History    Marital status: Unknown     Spouse name: Not on file    Number of children: Not on file    Years of education: Not on file    Highest education level: Not on file   Occupational History    Not on file   Social Needs    Financial resource strain: Not on file    Food insecurity:     Worry: Not on file     Inability: Not on file    Transportation needs:     Medical: Not on file     Non-medical: Not on file   Tobacco Use    Smoking status: Former Smoker     Types: Cigarettes     Last attempt to quit: 1/11/2010     Years since quitting: 10.0    Smokeless tobacco: Never Used   Substance and Sexual Activity    Alcohol use: Not on file    Drug use: Not on file    Sexual activity: Not on file   Lifestyle    Physical activity:     Days per week: Not on file     Minutes per session: Not on file    Stress: Not on file   Relationships    Social connections:     Talks on phone: Not on file     Gets together: Not on file     Attends Mosque service: Not on file     Active member of club or organization: Not on file     Attends meetings of clubs or organizations: Not on file     Relationship status: Not on file    Intimate partner violence:     Fear of current or ex partner: Not on file     Emotionally abused: Not on file     Physically abused: Not on file     Forced sexual activity: Not on file   Other Topics Concern    Not on file   Social History Narrative    Not on file       Family History:   History reviewed. No pertinent family history. Allergies:   Patient has no known allergies. Review of Systems:   Constitutional: No fevers or chills. Generalized fatigue  Head: No headaches  Eyes: No double vision or blurry vision. No conjunctival inflammation. ENT: No sore throat or runny nose. . No hearing loss, tinnitus or vertigo.   Cardiovascular: No chest pain or kidney again shows diminished perfusion and multifocal low-attenuation consistent with polynephritis and there are several low-attenuation areas consistent with abscess formation at the mid and upper pole. The kidney is smaller than the prior study suggesting reduction in edema. The kidney   does excrete contrast on the delayed images  IMPRESSION:  1. Persistent acute pyelonephritis of the right kidney with abscess formation. The amount of edema appears to have diminished as the kidney is smaller than the prior study. 2. Hepatomegaly. All CT scans at this facility use dose modulation, iterative reconstruction, and/or weight based dosing when appropriate to reduce radiation dose to as low as reasonably achievable. Medical Decision [id number]-Cctpk:       Medical Decision Making-Other:     Note:  · Labs, medications, radiologic studies were reviewed with personal review of films  · Large amounts of data were reviewed  · Discussed with nursing Staff, Discharge planner  · Infection Control and Prevention measures reviewed  · All prior entries were reviewed  · Administer medications as ordered  · Prognosis: Fair  · Discharge planning reviewed  · Follow up as outpatient. Thank you for allowing us to participate in the care of this patient. Please call with questions.     Alex Brown MD  Pager: [phone] - Office: [phone]

## 2020-01-13 NOTE — DISCHARGE SUMMARY
[facility] [place] [facility]    Discharge Summary     Patient ID: Татьяна Berry  :  1965   MRN: [id number]     ACCOUNT:  [de-identified]   Patient's PCP: Glo Felty, DO  Admit Date: 2020   Discharge Date: 2020     Length of Stay: 3  Code Status:  Full Code  Admitting Physician: Ama Serrano MD  Discharge Physician: Ama Serrano MD     Active Discharge Diagnoses:     Hospital Problem Lists:  Principal Problem:    Kidney abscess  Active Problems:    Type 2 diabetes mellitus without complication, without long-term current use of insulin (HCC)    Ex-smoker    Pyelonephritis    Iron deficiency anemia    Hepatomegaly  Resolved Problems:    * No resolved hospital problems. *      Admission Condition:  poor     Discharged Condition: fair    Hospital Stay:     Hospital Course:      Stephane Lim a 62 y.o. Non-/non  female who presents with No chief complaint on file.   and is admitted to the hospital for the management of right kidney pyonephritis/abscess. Patient was transferred to us from outside facility where she presented with progressively worsening right-sided flank pain that is constant 10/10 and sharp nonradiating, patient had known recent history of right kidney stone and by nephritis that caused her septic shock and JOSELIN in 2019 she was admitted to the hospital and was treated with antibiotics with no intervention and discharged on oral antibiotics, patient noted that the pain is worsening, denies fevers and chills, a follow-up appointment with infectious disease doctor next week, had a repeat CT abdomen pelvis at the ER that showed no change in her pyonephritis, ureteral stranding and multiple hypodense lesions inside her right kidney, reviewing ER labs showed anemia, thrombocytosis, elevated INR and hypo-albuminemia, no obvious urine infection in the report.   She also complains of left shoulder neck pain  Patient was transferred to our facility for further management. Other than the pain she denies any chest pain, sob, nausea, vomiting, fever or chills  Comorbidities include diabetes, hypertension, hyperlipidemia, depression, GERD. During the admission patient was managed as follow    IV Abx, plan for Rocephin for 2-3 weeks per ID   Hydration  Pain control  Urology eval is pending, ok for DC and F/U as OP   ID eval is pending  Anemia, iron deficiency, iron supplementation  BP control  Glycemic control, lantus , sliding scale  Thrombocytosis is better on repeat labs   Hypoalbuminemia might be related to malnutrition from recent illness  Voltaren gel        Addendum: Difficulty in patient transportation, case management still working on it  If we are unable to arrange for SNF the ride to the infusion center will be long but will be our only option at this point       Accepted  Med rec done  Scripts added   AMRIT signed  30+ minutes spent    Significant Diagnostic Studies:     Radiology:    STUDY: ABDOMEN AND PELVIS CT WITH CONTRAST   1/10 Meaghan Delgado )      COMPARISON:  December 26, 2019        TECHNIQUE: CT abdomen and pelvis was performed utilizing 5 mm axial reconstructions following the uneventful administration non ionic intravenous contrast. Coronal and sagittal reformatted images as well as delayed excretory phase images were obtained and reviewed.    Automated exposure control was utilized.        FINDINGS:  Examination compromised by body habitus    Lung bases show minimal atelectasis improved from prior study        No free air or free fluid        Bladder unremarkable and uterus and adnexa unremarkable        No evidence for small bowel obstruction        Terminal ileum and appendix appropriate        The liver is prominent measuring 20 cm in superior inferior.  No focal liver lesion identified        Gallbladder is decompressed on this examination        Spleen pancreas adrenal glands and left kidney Compound per protocol        CHANGE how you take these medications    * oxyCODONE-acetaminophen 5-325 MG per tablet  Commonly known as:  PERCOCET  What changed:  Another medication with the same name was added. Make sure you understand how and when to take each. * oxyCODONE-acetaminophen 5-325 MG per tablet  Commonly known as:  PERCOCET  Take 1 tablet by mouth every 8 hours as needed for Pain for up to 3 days. What changed: You were already taking a medication with the same name, and this prescription was added. Make sure you understand how and when to take each. * This list has 2 medication(s) that are the same as other medications prescribed for you. Read the directions carefully, and ask your doctor or other care provider to review them with you.             CONTINUE taking these medications    acetaminophen 325 MG tablet  Commonly known as:  TYLENOL     amitriptyline 25 MG tablet  Commonly known as:  ELAVIL     Ascorbic Acid 250 MG Chew     ASPIRIN 81 81 MG chewable tablet  Generic drug:  aspirin     buPROPion 150 MG extended release tablet  Commonly known as:  WELLBUTRIN SR     ferrous sulfate 325 (65 Fe) MG tablet     FLUoxetine 40 MG capsule  Commonly known as:  PROZAC     gabapentin 300 MG capsule  Commonly known as:  NEURONTIN     lisinopril-hydrochlorothiazide 10-12.5 MG per tablet  Commonly known as:  PRINZIDE;ZESTORETIC     metFORMIN 1000 MG tablet  Commonly known as:  GLUCOPHAGE     omeprazole 20 MG delayed release capsule  Commonly known as:  PRILOSEC     pravastatin 20 MG tablet  Commonly known as:  PRAVACHOL     Semaglutide(0.25 or 0.5MG/DOS) 2 MG/1.5ML Sopn     SENOKOT S 8.6-50 MG per tablet  Generic drug:  senna-docusate     therapeutic multivitamin-minerals tablet     TOUJEO MAX SOLOSTAR 300 UNIT/ML Sopn  Generic drug:  Insulin Glargine (2 Unit Dial)        STOP taking these medications    busPIRone 15 MG tablet  Commonly known as:  BUSPAR     ondansetron 4 MG tablet  Commonly known as: Edu Peña           Where to Get Your Medications      You can get these medications from any pharmacy    Bring a paper prescription for each of these medications  · cefTRIAXone  infusion     Information about where to get these medications is not yet available    Ask your nurse or doctor about these medications  · oxyCODONE-acetaminophen 5-325 MG per tablet         Time Spent on discharge is  33 mins in patient examination, evaluation, counseling as well as medication reconciliation, prescriptions for required medications, discharge plan and follow up. Electronically signed by   Willie Gates MD  1/14/2020  1:50 PM      Thank you Dr. Sally Michel DO for the opportunity to be involved in this patient's care.

## 2020-01-13 NOTE — PROGRESS NOTES
[facility] - Initial Consult Note  Today's Date and Time: 1/13/2020, 8:02 AM    Impression :   · Rt side pyelonephritis  · Possible Rt kidney intraparenchymal abscesses    Recommendations:   · Ceftriaxone 1 gm IV q 24 hr for another 2-3 weeks  · Monitor progression of Rt kidney by repeat CT in 2 weeks  · Will discuss with Urology. I don't see a single abscess or a perinephric abscess that could be drained. I fear that surgical intervention would require partial or total nephrectomy. Medical Decision Making/Summary/Discussion:1/13/2020     ·   Infection Control Recommendations   · [place] Precautions    Antimicrobial Stewardship Recommendations     · Targeted therapy  Coordination of Outpatient Care:   · Estimated Length of IV antimicrobials:2-3 weeks  · Patient will need Midline Catheter Insertion: Yes  · Patient will need PICC line Insertion:No  · Patient will need: Home IV , [place],  SNF,  LTAC:Yes  · Patient will need outpatient wound care:No    Chief complaint/reason for consultation:   · Complicated Rt pyelonephritis      History of Present Illness:   Essie Heredia is a 62y.o.-year-old  female who was initially admitted on 1/11/2020. Patient seen at the request of Quique Wilkes. INITIAL HISTORY:    Patient initially presented to [facility] ER with increase in pain in the right flank, left shoulder,neck and head. She had previously been seen in ER there on 12/17/2019 because of SOB and Rt side flank pain presumably from a kidney stone. There was concern for possible septic shock, pyelonephritis, acute renal failure and perinephric abcsess. Patient was transferred to [facility].   Her urine and blood cultures on 12-17-19 x 2 yielded E coli susceptible to ampicillin. She was treated at [facility] from 12-18 through 12-24-19 for pyelonephritis, perinephric abscess, DM 2, E coli septicemia.  She received ceftriaxone and was discharged on Bactrim through 1-120. She was evaluated by Dr Emelia Corley for ID. Urology evaluation was also obtained. They indicated that the patient was not a good candidate for drainage of the Rt renal abscesses. Apparently was seen again on 12/26 not feeling well and was transferred to [facility] where she was treated until 12-31-19. This time she was treated with Zosyn. IR felt that the renal abscess was amenable to IR drainage. Dr Carlos Zambrano of ID saw her. The patient was discharged on po Augmentin through 1-16-20. She had cardiac work up for Lt chest pain, shoulder pain. Patient returned to ER in [place] on 1-10-20  with chills at night, increase in pain, shortness of breath of one day duration. She was unable to ambulate into the hospital and needed a wheel chair. Her daughter was also concerned that patient was more confused. Patient was transferred to [facility] at that time. CURRENT EVALUATION : 1/13/2020    Afebrile  VS stable    Patient is feeling better. She denies urgency, dysuria, hematuria    She went to ER because of intermittent pain in the Rt flank, occasional fevers and felling cold. CT from [address] [facility] 1-10-20 reviewed and compared to CT 12-26-19. There is swelling of Rt kidney which has decreased compared to 12-26-19 scan. I don't appreciate a perinephric abscess, rather she seems to have several intraparenchymal areas with altered attenuation indicative of residual inflammation/ possibly intraparenchymal abscesses. In comparing the films there is no evolution towards formation of a single abscess. Rather there is improvement in the overall degree of swelling. Will discuss with Urology. Labs, X rays reviewed: 1/13/2020    BUN: 4  Cr:0.51    WBC: 6.9  Hb:8.7  Plat: 379    Cultures:  Urine:  ·   Blood:  · 1-11-20: No growth    Sputum :  ·   Wound:  ·     Discussed with patient, RN, family. CT from [address] 1-10-20 Reviewed. There is swelling of Rt kidney which has decreased compared to 12-26-19 scan. I don't appreciate a perinephric abscess, rather she seems to have several intraparenchymal areas with altered attenuation indicative of residual inflammation/ intraparenchymal abscesses. I have personally reviewed the past medical history, past surgical history, medications, social history, and family history, and I have updated the database accordingly. Past Medical History:   History reviewed. No pertinent past medical history. Past Surgical  History:   History reviewed. No pertinent surgical history.     Medications:      sodium chloride flush  10 mL Intravenous 2 times per day    [Held by provider] enoxaparin  40 mg Subcutaneous Daily    insulin lispro  0-6 Units Subcutaneous TID WC    insulin lispro  0-3 Units Subcutaneous Nightly    cefTRIAXone (ROCEPHIN) IV  1 g Intravenous Q24H    insulin glargine  40 Units Subcutaneous BID    pantoprazole  40 mg Oral QAM AC    amitriptyline  25 mg Oral Nightly    [Held by provider] aspirin  81 mg Oral Daily    FLUoxetine  40 mg Oral Daily    lisinopril-hydrochlorothiazide  1 tablet Oral Daily    pravastatin  20 mg Oral Nightly    sennosides-docusate sodium  1 tablet Oral Nightly    gabapentin  300 mg Oral TID    ferrous sulfate  325 mg Oral BID WC    buPROPion  150 mg Oral Daily    busPIRone  15 mg Oral Nightly       Social History:     Social History     Socioeconomic History    Marital status: Unknown     Spouse name: Not on file    Number of children: Not on file    Years of education: Not on file    Highest education level: Not on file   Occupational History    Not on file   Social Needs    Financial resource strain: Not on file    Food insecurity:     Worry: Not on file     Inability: Not on file    Transportation needs:     Medical: Not on file     Non-medical: Not on file   Tobacco Use    Smoking status: Former Smoker     Types: Cigarettes     Last attempt to quit: 1/11/2010     Years since quitting: 10.0    Smokeless tobacco: Never (36.6 °C) Oral 94 23   01/13/20 0050 (!) 114/52 98.3 °F (36.8 °C) Oral 91 17     General Appearance: Awake, alert, and in no apparent distress  Head:  Normocephalic, no trauma  Eyes: Pupils equal, round, reactive to light and accommodation; extraocular movements intact; sclera anicteric; conjunctivae pink. No embolic phenomena. ENT: Oropharynx clear, without erythema, exudate, or thrush. No tenderness of sinuses. Mouth/throat: mucosa pink and moist. No lesions. Dentition in good repair. Neck:Supple, without lymphadenopathy. Thyroid normal, No bruits. Pulmonary/Chest: Clear to auscultation, without wheezes, rales, or rhonchi. No dullness to percussion. Cardiovascular: Regular rate and rhythm without murmurs, rubs, or gallops. Abdomen: Soft, non tender. Bowel sounds normal. No organomegaly  All four Extremities: No cyanosis, clubbing, edema, or effusions. Neurologic: No gross sensory or motor deficits. Skin: Warm and dry with good turgor. No signs of peripheral arterial or venous insufficiency. No ulcerations. No open wounds. Medical Decision Making -Laboratory:   I have independently reviewed/ordered the following labs:    CBC with Differential:   Recent Labs     01/12/20  0634 01/13/20  0459   WBC 6.6 6.5   HGB 8.5* 8.4*   HCT 28.4* 28.6*    368   LYMPHOPCT 25 32   MONOPCT 9 8     BMP:   Recent Labs     01/11/20  1200      K 3.9   CL 97*   CO2 27   BUN 4*   CREATININE 0.51   MG 1.6     Hepatic Function Panel:   Recent Labs     01/11/20  1200   PROT 7.9   LABALBU 2.9*   BILITOT 0.35   ALKPHOS 85   ALT 14   AST 13     No results for input(s): RPR in the last 72 hours. No results for input(s): HIV in the last 72 hours. No results for input(s): BC in the last 72 hours.   Lab Results   Component Value Date    RBC 3.33 01/13/2020    WBC 6.5 01/13/2020     Lab Results   Component Value Date    CREATININE 0.51 01/11/2020    GLUCOSE 149 01/11/2020       Medical Decision Making-Imaging:   STUDY: ABDOMEN AND PELVIS CT WITH CONTRAST   CLINICAL HISTORY:  admitted and in the ER multiple times in the last 3 weeks. Pt states she's still not feeling well. C/o Confusion, sharp pains RT upper side of abd/chest, SOB, still has pain shooting up into her lt shoulder and into the base of her skull, abscess around lt kidney, low grade fever. Pt was injected with 100ml's of Omni 350  COMPARISON: December 26, 2019  TECHNIQUE: CT abdomen and pelvis was performed utilizing 5 mm axial reconstructions following the uneventful administration non ionic intravenous contrast. Coronal and sagittal reformatted images as well as delayed excretory phase images were obtained and reviewed. Automated exposure control was utilized. FINDINGS: Examination compromised by body habitus  Lung bases show minimal atelectasis improved from prior study  No free air or free fluid  Bladder unremarkable and uterus and adnexa unremarkable  No evidence for small bowel obstruction  Terminal ileum and appendix appropriate  The liver is prominent measuring 20 cm in superior inferior. No focal liver lesion identified  Gallbladder is decompressed on this examination  Spleen pancreas adrenal glands and left kidney unremarkable  Right kidney again shows diminished perfusion and multifocal low-attenuation consistent with polynephritis and there are several low-attenuation areas consistent with abscess formation at the mid and upper pole. The kidney is smaller than the prior study suggesting reduction in edema. The kidney   does excrete contrast on the delayed images  IMPRESSION:  1. Persistent acute pyelonephritis of the right kidney with abscess formation. The amount of edema appears to have diminished as the kidney is smaller than the prior study. 2. Hepatomegaly.   All CT scans at this facility use dose modulation, iterative reconstruction, and/or weight based dosing when appropriate to reduce radiation dose to as low as reasonably achievable. Medical Decision Cmqhyk-Yyjpicql-Ehiae:       Medical Decision Making-Other:     Note:  · Labs, medications, radiologic studies were reviewed with personal review of films  · Large amounts of data were reviewed  · Discussed with nursing Staff, Discharge planner  · Infection Control and Prevention measures reviewed  · All prior entries were reviewed  · Administer medications as ordered  · Prognosis: Fair  · Discharge planning reviewed  · Follow up as outpatient. Thank you for allowing us to participate in the care of this patient. Please call with questions.     Saman Mckay  Pager: [phone] - Office: [phone]

## 2020-01-13 NOTE — PROGRESS NOTES
Obdulia Jin [facility]    Progress Note    1/13/2020    5:08 PM    Name:   Jeromy Gresham  MRN:     [id number]     Acct:      [de-identified]   Room:   [address] [place] [address] Day:  2  Admit Date:  1/11/2020 10:56 AM    PCP:   Raza Witt DO  Code Status:  Full Code    Subjective:     C/C:   Right flank pain    Interval History Status: improved. Patient seen and examined at bedside, no acute events overnight. Feeling better and the pain is controlled. Afebrile overnight   Patient denies any chest pain, shortness of breath, chills, fevers, nausea or vomiting. Patient vitals, labs and all providers notes were reviewed,from overnight shift and morning updates were noted and discussed with the nurse    Brief History:     Jeromy Gresham is a 62 y.o. Non-/non  female who presents with No chief complaint on file. and is admitted to the hospital for the management of right kidney pyonephritis/abscess. Patient was transferred to us from outside facility where she presented with progressively worsening right-sided flank pain that is constant 10/10 and sharp nonradiating, patient had known recent history of right kidney stone and by nephritis that caused her septic shock and JOSELIN in December 2019 she was admitted to the hospital and was treated with antibiotics with no intervention and discharged on oral antibiotics, patient noted that the pain is worsening, denies fevers and chills, a follow-up appointment with infectious disease doctor next week, had a repeat CT abdomen pelvis at the ER that showed no change in her pyonephritis, ureteral stranding and multiple hypodense lesions inside her right kidney, reviewing ER labs showed anemia, thrombocytosis, elevated INR and hypo-albuminemia, no obvious urine infection in the report. She also complains of left shoulder neck pain  Patient was transferred to our facility for further management.   Other than the

## 2020-01-14 VITALS
OXYGEN SATURATION: 94 % | DIASTOLIC BLOOD PRESSURE: 48 MMHG | WEIGHT: 235.3 LBS | TEMPERATURE: 98.1 F | BODY MASS INDEX: 41.69 KG/M2 | HEIGHT: 63 IN | SYSTOLIC BLOOD PRESSURE: 117 MMHG | RESPIRATION RATE: 18 BRPM | HEART RATE: 88 BPM

## 2020-01-14 LAB
ABSOLUTE EOS #: 0.12 K/UL (ref 0–0.44)
ABSOLUTE IMMATURE GRANULOCYTE: 0.05 K/UL (ref 0–0.3)
ABSOLUTE LYMPH #: 2.01 K/UL (ref 1.1–3.7)
ABSOLUTE MONO #: 0.58 K/UL (ref 0.1–1.2)
ANION GAP SERPL CALCULATED.3IONS-SCNC: 13 MMOL/L (ref 9–17)
BASOPHILS # BLD: 1 % (ref 0–2)
BASOPHILS ABSOLUTE: 0.05 K/UL (ref 0–0.2)
BUN BLDV-MCNC: 5 MG/DL (ref 6–20)
BUN/CREAT BLD: ABNORMAL (ref 9–20)
CALCIUM SERPL-MCNC: 8.9 MG/DL (ref 8.6–10.4)
CHLORIDE BLD-SCNC: 94 MMOL/L (ref 98–107)
CO2: 26 MMOL/L (ref 20–31)
CREAT SERPL-MCNC: 0.61 MG/DL (ref 0.5–0.9)
DIFFERENTIAL TYPE: ABNORMAL
EOSINOPHILS RELATIVE PERCENT: 2 % (ref 1–4)
GFR AFRICAN AMERICAN: >60 ML/MIN
GFR NON-AFRICAN AMERICAN: >60 ML/MIN
GFR SERPL CREATININE-BSD FRML MDRD: ABNORMAL ML/MIN/{1.73_M2}
GFR SERPL CREATININE-BSD FRML MDRD: ABNORMAL ML/MIN/{1.73_M2}
GLUCOSE BLD-MCNC: 192 MG/DL (ref 65–105)
GLUCOSE BLD-MCNC: 208 MG/DL (ref 65–105)
GLUCOSE BLD-MCNC: 213 MG/DL (ref 65–105)
GLUCOSE BLD-MCNC: 234 MG/DL (ref 70–99)
GLUCOSE BLD-MCNC: 260 MG/DL (ref 65–105)
HCT VFR BLD CALC: 28.8 % (ref 36.3–47.1)
HEMOGLOBIN: 8.5 G/DL (ref 11.9–15.1)
IMMATURE GRANULOCYTES: 1 %
LYMPHOCYTES # BLD: 30 % (ref 24–43)
MCH RBC QN AUTO: 25.4 PG (ref 25.2–33.5)
MCHC RBC AUTO-ENTMCNC: 29.5 G/DL (ref 28.4–34.8)
MCV RBC AUTO: 86.2 FL (ref 82.6–102.9)
MONOCYTES # BLD: 9 % (ref 3–12)
NRBC AUTOMATED: 0 PER 100 WBC
PDW BLD-RTO: 15.1 % (ref 11.8–14.4)
PLATELET # BLD: 383 K/UL (ref 138–453)
PLATELET ESTIMATE: ABNORMAL
PMV BLD AUTO: 8.8 FL (ref 8.1–13.5)
POTASSIUM SERPL-SCNC: 3.9 MMOL/L (ref 3.7–5.3)
RBC # BLD: 3.34 M/UL (ref 3.95–5.11)
RBC # BLD: ABNORMAL 10*6/UL
SEG NEUTROPHILS: 57 % (ref 36–65)
SEGMENTED NEUTROPHILS ABSOLUTE COUNT: 3.85 K/UL (ref 1.5–8.1)
SODIUM BLD-SCNC: 133 MMOL/L (ref 135–144)
WBC # BLD: 6.7 K/UL (ref 3.5–11.3)
WBC # BLD: ABNORMAL 10*3/UL

## 2020-01-14 PROCEDURE — 99239 HOSP IP/OBS DSCHRG MGMT >30: CPT | Performed by: FAMILY MEDICINE

## 2020-01-14 PROCEDURE — C1751 CATH, INF, PER/CENT/MIDLINE: HCPCS

## 2020-01-14 PROCEDURE — 6360000002 HC RX W HCPCS: Performed by: NURSE PRACTITIONER

## 2020-01-14 PROCEDURE — 99232 SBSQ HOSP IP/OBS MODERATE 35: CPT | Performed by: INTERNAL MEDICINE

## 2020-01-14 PROCEDURE — 76937 US GUIDE VASCULAR ACCESS: CPT

## 2020-01-14 PROCEDURE — 85025 COMPLETE CBC W/AUTO DIFF WBC: CPT

## 2020-01-14 PROCEDURE — 97110 THERAPEUTIC EXERCISES: CPT

## 2020-01-14 PROCEDURE — 36415 COLL VENOUS BLD VENIPUNCTURE: CPT

## 2020-01-14 PROCEDURE — 80048 BASIC METABOLIC PNL TOTAL CA: CPT

## 2020-01-14 PROCEDURE — 2580000003 HC RX 258: Performed by: INTERNAL MEDICINE

## 2020-01-14 PROCEDURE — 6370000000 HC RX 637 (ALT 250 FOR IP): Performed by: FAMILY MEDICINE

## 2020-01-14 PROCEDURE — 6370000000 HC RX 637 (ALT 250 FOR IP): Performed by: NURSE PRACTITIONER

## 2020-01-14 PROCEDURE — 82947 ASSAY GLUCOSE BLOOD QUANT: CPT

## 2020-01-14 PROCEDURE — 6360000002 HC RX W HCPCS: Performed by: INTERNAL MEDICINE

## 2020-01-14 PROCEDURE — 97116 GAIT TRAINING THERAPY: CPT

## 2020-01-14 PROCEDURE — 2580000003 HC RX 258: Performed by: NURSE PRACTITIONER

## 2020-01-14 RX ADMIN — PANTOPRAZOLE SODIUM 40 MG: 40 TABLET, DELAYED RELEASE ORAL at 08:48

## 2020-01-14 RX ADMIN — DICLOFENAC 4 G: 10 GEL TOPICAL at 21:02

## 2020-01-14 RX ADMIN — AMITRIPTYLINE HYDROCHLORIDE 25 MG: 25 TABLET, FILM COATED ORAL at 20:51

## 2020-01-14 RX ADMIN — FERROUS SULFATE TAB EC 325 MG (65 MG FE EQUIVALENT) 325 MG: 325 (65 FE) TABLET DELAYED RESPONSE at 16:49

## 2020-01-14 RX ADMIN — INSULIN GLARGINE 40 UNITS: 100 INJECTION, SOLUTION SUBCUTANEOUS at 10:41

## 2020-01-14 RX ADMIN — INSULIN GLARGINE 40 UNITS: 100 INJECTION, SOLUTION SUBCUTANEOUS at 20:55

## 2020-01-14 RX ADMIN — CEFTRIAXONE SODIUM 1 G: 1 INJECTION, POWDER, FOR SOLUTION INTRAMUSCULAR; INTRAVENOUS at 11:47

## 2020-01-14 RX ADMIN — FLUOXETINE HYDROCHLORIDE 40 MG: 20 CAPSULE ORAL at 08:47

## 2020-01-14 RX ADMIN — INSULIN LISPRO 2 UNITS: 100 INJECTION, SOLUTION INTRAVENOUS; SUBCUTANEOUS at 20:55

## 2020-01-14 RX ADMIN — OXYCODONE HYDROCHLORIDE AND ACETAMINOPHEN 2 TABLET: 5; 325 TABLET ORAL at 01:56

## 2020-01-14 RX ADMIN — GABAPENTIN 300 MG: 300 CAPSULE ORAL at 20:51

## 2020-01-14 RX ADMIN — OXYCODONE HYDROCHLORIDE AND ACETAMINOPHEN 2 TABLET: 5; 325 TABLET ORAL at 12:46

## 2020-01-14 RX ADMIN — PRAVASTATIN SODIUM 20 MG: 20 TABLET ORAL at 20:51

## 2020-01-14 RX ADMIN — SENNOSIDES AND DOCUSATE SODIUM 1 TABLET: 8.6; 5 TABLET ORAL at 20:52

## 2020-01-14 RX ADMIN — SODIUM CHLORIDE, PRESERVATIVE FREE 10 ML: 5 INJECTION INTRAVENOUS at 08:48

## 2020-01-14 RX ADMIN — BUPROPION HYDROCHLORIDE 150 MG: 150 TABLET, EXTENDED RELEASE ORAL at 08:48

## 2020-01-14 RX ADMIN — OXYCODONE HYDROCHLORIDE AND ACETAMINOPHEN 2 TABLET: 5; 325 TABLET ORAL at 20:52

## 2020-01-14 RX ADMIN — OXYCODONE HYDROCHLORIDE AND ACETAMINOPHEN 2 TABLET: 5; 325 TABLET ORAL at 16:49

## 2020-01-14 RX ADMIN — BUSPIRONE HYDROCHLORIDE 15 MG: 15 TABLET ORAL at 20:51

## 2020-01-14 RX ADMIN — DICLOFENAC 4 G: 10 GEL TOPICAL at 11:46

## 2020-01-14 RX ADMIN — FERROUS SULFATE TAB EC 325 MG (65 MG FE EQUIVALENT) 325 MG: 325 (65 FE) TABLET DELAYED RESPONSE at 08:48

## 2020-01-14 RX ADMIN — GABAPENTIN 300 MG: 300 CAPSULE ORAL at 08:48

## 2020-01-14 RX ADMIN — INSULIN LISPRO 2 UNITS: 100 INJECTION, SOLUTION INTRAVENOUS; SUBCUTANEOUS at 08:47

## 2020-01-14 RX ADMIN — SODIUM CHLORIDE, PRESERVATIVE FREE 10 ML: 5 INJECTION INTRAVENOUS at 21:02

## 2020-01-14 RX ADMIN — INSULIN LISPRO 1 UNITS: 100 INJECTION, SOLUTION INTRAVENOUS; SUBCUTANEOUS at 11:47

## 2020-01-14 RX ADMIN — GABAPENTIN 300 MG: 300 CAPSULE ORAL at 14:49

## 2020-01-14 RX ADMIN — ASPIRIN 81 MG: 81 TABLET, CHEWABLE ORAL at 08:48

## 2020-01-14 RX ADMIN — ENOXAPARIN SODIUM 40 MG: 40 INJECTION SUBCUTANEOUS at 08:48

## 2020-01-14 RX ADMIN — LISINOPRIL AND HYDROCHLOROTHIAZIDE 1 TABLET: 12.5; 1 TABLET ORAL at 08:48

## 2020-01-14 RX ADMIN — INSULIN LISPRO 2 UNITS: 100 INJECTION, SOLUTION INTRAVENOUS; SUBCUTANEOUS at 16:49

## 2020-01-14 RX ADMIN — OXYCODONE HYDROCHLORIDE AND ACETAMINOPHEN 2 TABLET: 5; 325 TABLET ORAL at 08:42

## 2020-01-14 ASSESSMENT — PAIN SCALES - GENERAL
PAINLEVEL_OUTOF10: 9
PAINLEVEL_OUTOF10: 5
PAINLEVEL_OUTOF10: 7
PAINLEVEL_OUTOF10: 10
PAINLEVEL_OUTOF10: 7
PAINLEVEL_OUTOF10: 5

## 2020-01-14 NOTE — PROGRESS NOTES
possible septic shock, pyelonephritis, acute renal failure and perinephric abcsess. Patient was transferred to [facility].   Her urine and blood cultures on 12-17-19 x 2 yielded E coli susceptible to ampicillin. She was treated at [facility] from 12-18 through 12-24-19 for pyelonephritis, perinephric abscess, DM 2, E coli septicemia. She received ceftriaxone and was discharged on Bactrim through 1-120. She was evaluated by Dr Tereza Schneider for ID. Urology evaluation was also obtained. They indicated that the patient was not a good candidate for drainage of the Rt renal abscesses. Apparently was seen again on 12/26 not feeling well and was transferred to [facility] where she was treated until 12-31-19. This time she was treated with Zosyn. IR felt that the renal abscess was amenable to IR drainage. Dr Desiree Suresh of ID saw her. The patient was discharged on po Augmentin through 1-16-20. She had cardiac work up for Lt chest pain, shoulder pain. Patient returned to ER in [place] on 1-10-20  with chills at night, increase in pain, shortness of breath of one day duration. She was unable to ambulate into the hospital and needed a wheel chair. Her daughter was also concerned that patient was more confused. Patient was transferred to [facility] at that time. CURRENT EVALUATION : 1/14/2020    Afebrile  VS stable    Patient is feeling better. She denies urgency, dysuria, hematuria  She reports that she continues to have some Lt shoulder pain today  Mild residual Rt flank pain with deep inspiration    She went to ER because of intermittent pain in the Rt flank, occasional fevers and felling cold. CT from [address] 1-10-20 reviewed and compared to CT 12-26-19. There is swelling of Rt kidney which has decreased compared to 12-26-19 scan.  I don't appreciate a perinephric abscess, rather she seems to have several intraparenchymal areas with altered attenuation indicative of residual inflammation/ possibly intraparenchymal mg Oral Nightly    aspirin  81 mg Oral Daily    FLUoxetine  40 mg Oral Daily    lisinopril-hydrochlorothiazide  1 tablet Oral Daily    pravastatin  20 mg Oral Nightly    sennosides-docusate sodium  1 tablet Oral Nightly    gabapentin  300 mg Oral TID    ferrous sulfate  325 mg Oral BID WC    buPROPion  150 mg Oral Daily    busPIRone  15 mg Oral Nightly       Social History:     Social History     Socioeconomic History    Marital status: Unknown     Spouse name: Not on file    Number of children: Not on file    Years of education: Not on file    Highest education level: Not on file   Occupational History    Not on file   Social Needs    Financial resource strain: Not on file    Food insecurity:     Worry: Not on file     Inability: Not on file    Transportation needs:     Medical: Not on file     Non-medical: Not on file   Tobacco Use    Smoking status: Former Smoker     Types: Cigarettes     Last attempt to quit: 1/11/2010     Years since quitting: 10.0    Smokeless tobacco: Never Used   Substance and Sexual Activity    Alcohol use: Not on file    Drug use: Not on file    Sexual activity: Not on file   Lifestyle    Physical activity:     Days per week: Not on file     Minutes per session: Not on file    Stress: Not on file   Relationships    Social connections:     Talks on phone: Not on file     Gets together: Not on file     Attends [employer] service: Not on file     Active member of club or organization: Not on file     Attends meetings of clubs or organizations: Not on file     Relationship status: Not on file    Intimate partner violence:     Fear of current or ex partner: Not on file     Emotionally abused: Not on file     Physically abused: Not on file     Forced sexual activity: Not on file   Other Topics Concern    Not on file   Social History Narrative    Not on file       Family History:   History reviewed. No pertinent family history.      Allergies:   Patient has no known allergies. Review of Systems:   Constitutional: No fevers or chills. Residual Rt flank and Lt shoulder pain  Head: No headaches  Eyes: No double vision or blurry vision. No conjunctival inflammation. ENT: No sore throat or runny nose. . No hearing loss, tinnitus or vertigo. Cardiovascular: No chest pain or palpitations. No shortness of breath. No ROBERTO  Lung: No shortness of breath or cough. No sputum production  Abdomen: No nausea, vomiting, diarrhea, or abdominal pain. Angelique Lukes No cramps. Genitourinary: No increased urinary frequency, or dysuria. No hematuria. No suprapubic. Intermittent Rt CVA pain  Musculoskeletal: Lt shoulder achey today. No joint effusions, swelling or deformities  Hematologic: No bleeding or bruising. Neurologic: No headache, weakness, numbness, or tingling. Integument: No rash, no ulcers. Psychiatric: No depression. Endocrine: No polyuria, no polydipsia, no polyphagia. Physical Examination :     Patient Vitals for the past 8 hrs:   BP Temp Temp src Pulse Resp SpO2 Weight   01/14/20 0735 (!) 135/54 98.3 °F (36.8 °C) Oral 80 20 91 % --   01/14/20 0450 120/62 98.6 °F (37 °C) Axillary 87 23 96 % 235 lb 4.8 oz (106.7 kg)     General Appearance: Awake, alert, and in no apparent distress  Head:  Normocephalic, no trauma  Eyes: Pupils equal, round, reactive to light and accommodation; extraocular movements intact; sclera anicteric; conjunctivae pink. No embolic phenomena. ENT: Oropharynx clear, without erythema, exudate, or thrush. No tenderness of sinuses. Mouth/throat: mucosa pink and moist. No lesions. Dentition in good repair. Neck:Supple, without lymphadenopathy. Thyroid normal, No bruits. Pulmonary/Chest: Clear to auscultation, without wheezes, rales, or rhonchi. No dullness to percussion. Cardiovascular: Regular rate and rhythm without murmurs, rubs, or gallops. Abdomen: Soft, non tender. Obese.  Bowel sounds normal. No organomegaly  All four Extremities: No cyanosis, clubbing, edema, or effusions. Neurologic: No gross sensory or motor deficits. Skin: Warm and dry with good turgor. No signs of peripheral arterial or venous insufficiency. No ulcerations. No open wounds. Medical Decision Making -Laboratory:   I have independently reviewed/ordered the following labs:    CBC with Differential:   Recent Labs     01/13/20  0459 01/14/20  0540   WBC 6.5 6.7   HGB 8.4* 8.5*   HCT 28.6* 28.8*    383   LYMPHOPCT 32 30   MONOPCT 8 9     BMP:   Recent Labs     01/11/20  1200      K 3.9   CL 97*   CO2 27   BUN 4*   CREATININE 0.51   MG 1.6     Hepatic Function Panel:   Recent Labs     01/11/20  1200   PROT 7.9   LABALBU 2.9*   BILITOT 0.35   ALKPHOS 85   ALT 14   AST 13     No results for input(s): RPR in the last 72 hours. No results for input(s): HIV in the last 72 hours. No results for input(s): BC in the last 72 hours. Lab Results   Component Value Date    RBC 3.34 01/14/2020    WBC 6.7 01/14/2020     Lab Results   Component Value Date    CREATININE 0.51 01/11/2020    GLUCOSE 149 01/11/2020       Medical Decision Making-Imaging:   STUDY: ABDOMEN AND PELVIS CT WITH CONTRAST   CLINICAL HISTORY:  admitted and in the ER multiple times in the last 3 weeks. Pt states she's still not feeling well. C/o Confusion, sharp pains RT upper side of abd/chest, SOB, still has pain shooting up into her lt shoulder and into the base of her skull, abscess around lt kidney, low grade fever. Pt was injected with 100ml's of Omni 350  COMPARISON: December 26, 2019  TECHNIQUE: CT abdomen and pelvis was performed utilizing 5 mm axial reconstructions following the uneventful administration non ionic intravenous contrast. Coronal and sagittal reformatted images as well as delayed excretory phase images were obtained and reviewed. Automated exposure control was utilized.   FINDINGS: Examination compromised by body habitus  Lung bases show minimal atelectasis improved from prior study  No free air or free fluid  Bladder unremarkable and uterus and adnexa unremarkable  No evidence for small bowel obstruction  Terminal ileum and appendix appropriate  The liver is prominent measuring 20 cm in superior inferior. No focal liver lesion identified  Gallbladder is decompressed on this examination  Spleen pancreas adrenal glands and left kidney unremarkable  Right kidney again shows diminished perfusion and multifocal low-attenuation consistent with polynephritis and there are several low-attenuation areas consistent with abscess formation at the mid and upper pole. The kidney is smaller than the prior study suggesting reduction in edema. The kidney   does excrete contrast on the delayed images  IMPRESSION:  1. Persistent acute pyelonephritis of the right kidney with abscess formation. The amount of edema appears to have diminished as the kidney is smaller than the prior study. 2. Hepatomegaly. All CT scans at this facility use dose modulation, iterative reconstruction, and/or weight based dosing when appropriate to reduce radiation dose to as low as reasonably achievable. Medical Decision Pnepwd-Odzzczmw-Kwews:       Medical Decision Making-Other:     Note:  · Labs, medications, radiologic studies were reviewed with personal review of films  · Large amounts of data were reviewed  · Discussed with nursing Staff, Discharge planner  · Infection Control and Prevention measures reviewed  · All prior entries were reviewed  · Administer medications as ordered  · Prognosis: Fair  · Discharge planning reviewed  · Follow up as outpatient. Thank you for allowing us to participate in the care of this patient. Please call with questions.     Radhika Leiva MD  Pager: [phone] - Office: [phone]

## 2020-01-14 NOTE — PROGRESS NOTES
Physical Therapy  Facility/Department: [facility] CAR 3  Daily Treatment Note  NAME: Jolanta Orellana  : 1965  MRN: [id number]    Date of Service: 2020    Discharge Recommendations:  No further therapy required at discharge. PT Equipment Recommendations  Equipment Needed: No    Assessment   Body structures, Functions, Activity limitations: Decreased endurance;Decreased balance  Assessment: Pt ambulating functional distances with no LOB. Pt able to retrieve object from floor and dress herself independently. Prognosis: Excellent  PT Education: General Safety;Home Exercise Program  REQUIRES PT FOLLOW UP: Yes  Activity Tolerance  Activity Tolerance: Patient Tolerated treatment well     Patient Diagnosis(es): The encounter diagnosis was Kidney abscess. has no past medical history on file. has no past surgical history on file. Restrictions  Restrictions/Precautions  Restrictions/Precautions: Up as Tolerated  Required Braces or Orthoses?: No  Subjective   General  Chart Reviewed: Yes  Response To Previous Treatment: Patient with no complaints from previous session. Family / Caregiver Present: No  Subjective  Subjective: Pt sitting EOB upon arrival to room. Offers no c/o pain.    General Comment  Comments: Pt left in chair at end of session  Pain Screening  Patient Currently in Pain: Denies  Vital Signs  Patient Currently in Pain: Denies       Orientation  Orientation  Overall Orientation Status: Within Normal Limits  Cognition      Objective      Transfers  Sit to Stand: Supervision  Stand to sit: Supervision  Ambulation  Ambulation?: Yes  Ambulation 1  Surface: level tile  Device: No Device  Assistance: Contact guard assistance  Quality of Gait: decreased pace, mildly unsteady but no LOB  Distance: 250ft  Comments: 1 brief standing rest break     Balance  Posture: Good  Sitting - Static: Good  Sitting - Dynamic: Good  Standing - Static: Good  Standing - Dynamic: Good       Exercises  Seated LE exercise program: [employer], hip abduction/adduction, heel/toe raises, and marches. Reps:20x each    Goals  Short term goals  Time Frame for Short term goals: 8 visits  Short term goal 1: Independent transfers  Short term goal 2: Independent ambulation with least restrictive device 400 ft  Short term goal 3: Navigate 3 stairs with 1 rail SBA  Short term goal 4: Pt to tolerate 30 minutes of activity to improve endurance. Short term goal 5: Pt to be independent with Home exercise program.  Patient Goals   Patient goals : Feel stronger and gain more endurance. Plan    Plan  Times per week: 5x/week  Current Treatment Recommendations: Strengthening, Gait Training, Stair training, Balance Training, Functional Mobility Training, Endurance Training, Home Exercise Program, Transfer Training, Safety Education & Training  Safety Devices  Type of devices:  All fall risk precautions in place, Left in chair, Call light within reach, Gait belt, Nurse notified  Restraints  Initially in place: No     Therapy Time   Individual Concurrent Group Co-treatment   Time In [address] [place] [address]         Time Out 0325         Minutes CAROLE Meza

## 2020-01-14 NOTE — PLAN OF CARE
Problem: Falls - Risk of:  Goal: Will remain free from falls  Description  Will remain free from falls  Outcome: Ongoing  Note:   Bed locked in lowest position, call light and personal belongings within reach, non-skid footwear on, side rails x3 per patient request, adequate room lighting maintained, floor kept clear of clutter, and hourly rounding continued. Goal: Absence of physical injury  Description  Absence of physical injury  Outcome: Ongoing     Problem: Pain:  Description  Pain management should include both nonpharmacologic and pharmacologic interventions. Goal: Pain level will decrease  Description  Pain level will decrease  Outcome: Ongoing  Note:   Encouraged to drink water, PRN pain medication given per written order, and non-pharmacological methods encouraged and used.   Goal: Control of acute pain  Description  Control of acute pain  Outcome: Ongoing  Goal: Control of chronic pain  Description  Control of chronic pain  Outcome: Ongoing

## 2020-01-14 NOTE — DISCHARGE INSTR - COC
4.8 oz (106.7 kg)     Mental Status:  oriented, alert, thought processes intact and able to concentrate and follow conversation    IV Access:  - Midline IV    Nursing Mobility/ADLs:  Walking   supervision  Transfer  Supervision  Bathing  Supervision  Dressing  Upper body supervison lower body stand by assistance  Toileting  stand by assistance  Feeding  Independent  Med Admin  assisted with IV antibiotics  Med Delivery   whole    Wound Care Documentation and Therapy        Elimination:  Continence:   · Bowel: {YES / ZD:66678}  · Bladder: {YES / GJ:70097}  Urinary Catheter: None   Colostomy/Ileostomy/Ileal Conduit: {YES / UT:23456}       Date of Last BM: 1/13/2020    Intake/Output Summary (Last 24 hours) at 1/14/2020 1344  Last data filed at 1/14/2020 0646  Gross per 24 hour   Intake 1750 ml   Output 2000 ml   Net -250 ml     I/O last 3 completed shifts: In: 2922 [P.O.:1750]  Out: 2020 [Urine:2020]    Safety Concerns:     None    Impairments/Disabilities:      Vision (glasses)    Nutrition Therapy:  Current Nutrition Therapy:   - Oral Diet:  Carb Control 4 carbs/meal (1800kcals/day)    Routes of Feeding: Oral  Liquids: {Slp liquid thickness:47709}  Daily Fluid Restriction: no  Last Modified Barium Swallow with Video (Video Swallowing Test): not done    Treatments at the Time of Hospital Discharge:   Respiratory Treatments: ***  Oxygen Therapy:  is not on home oxygen therapy. Ventilator:    - No ventilator support    Rehab Therapies: {THERAPEUTIC INTERVENTION:4170827363}  Weight Bearing Status/Restrictions: No weight bearing restirctions  Other Medical Equipment (for information only, NOT a DME order):  {EQUIPMENT:042520062}  Other Treatments: ***    Patient's personal belongings (please select all that are sent with patient):  Glasses, Jewelry, night gown, home bag with clothes.     RN SIGNATURE:  Electronically signed by Nell Baltazar RN on 1/14/20 at 1:56 PM    CASE MANAGEMENT/SOCIAL [address]

## 2020-01-14 NOTE — PROGRESS NOTES
[address]    Progress Note    1/14/2020    11:21 AM    Name:   Jones Whipple  MRN:     [id number]     Acct:      [de-identified]   Room:   [facility] Day:  3  Admit Date:  1/11/2020 10:56 AM    PCP:   Medina Gibbs DO  Code Status:  Full Code    Subjective:     C/C:   Right flank pain    Interval History Status: improved. Patient seen and examined at bedside, no acute events overnight. Feeling better and the pain is controlled. Afebrile overnight    Had to stay and DC was held yesterday as there was issues with the transportation to the infusion center  Still with some neck muscle pain  Patient denies any chest pain, shortness of breath, chills, fevers, nausea or vomiting. Patient vitals, labs and all providers notes were reviewed,from overnight shift and morning updates were noted and discussed with the nurse    Brief History:     Jones Whipple is a 62 y.o. Non-/non  female who presents with No chief complaint on file. and is admitted to the hospital for the management of right kidney pyonephritis/abscess.   Patient was transferred to us from outside facility where she presented with progressively worsening right-sided flank pain that is constant 10/10 and sharp nonradiating, patient had known recent history of right kidney stone and by nephritis that caused her septic shock and JOSELIN in December 2019 she was admitted to the hospital and was treated with antibiotics with no intervention and discharged on oral antibiotics, patient noted that the pain is worsening, denies fevers and chills, a follow-up appointment with infectious disease doctor next week, had a repeat CT abdomen pelvis at the ER that showed no change in her pyonephritis, ureteral stranding and multiple hypodense lesions inside her right kidney, reviewing ER labs showed anemia, thrombocytosis, elevated INR and hypo-albuminemia, no obvious urine infection use included cigarettes. She has never used smokeless tobacco.     Family History: History reviewed. No pertinent family history. Vitals:  BP (!) 135/54   Pulse 80   Temp 98.3 °F (36.8 °C) (Oral)   Resp 20   Ht 5' 3\" (1.6 m)   Wt 235 lb 4.8 oz (106.7 kg)   SpO2 91% Comment: nurse notified  BMI 41.68 kg/m²   Temp (24hrs), Av.2 °F (36.8 °C), Min:97.8 °F (36.6 °C), Max:98.6 °F (37 °C)    Recent Labs     20  1145 20  1638 20  0741   POCGLU 163* 150* 126* 213*       I/O (24Hr): Intake/Output Summary (Last 24 hours) at 2020 1121  Last data filed at 2020 0646  Gross per 24 hour   Intake 1750 ml   Output 2000 ml   Net -250 ml       Labs:  Hematology:  Recent Labs     20  1200 20  0634 20  0459 20  0540   WBC 6.9 6.6 6.5 6.7   RBC 3.43* 3.19* 3.33* 3.34*   HGB 8.7* 8.5* 8.4* 8.5*   HCT 28.5* 28.4* 28.6* 28.8*   MCV 83.1 89.0 85.9 86.2   MCH 25.4 26.6 25.2 25.4   MCHC 30.5 29.9 29.4 29.5   RDW 15.2* 15.7* 15.2* 15.1*    330 368 383   MPV 8.6 8.6 8.7 8.8   INR 1.1 1.1  --   --      Chemistry:  Recent Labs     20  1200      K 3.9   CL 97*   CO2 27   GLUCOSE 149*   BUN 4*   CREATININE 0.51   MG 1.6   ANIONGAP 12   LABGLOM >60   GFRAA >60   CALCIUM 8.7   CAION 1.16   PHOS 3.4     Recent Labs     20  1200  20  2106 20  0845 20  1145 20  1638 20  0741   PROT 7.9  --   --   --   --   --   --   --    LABALBU 2.9*  --   --   --   --   --   --   --    AST 13  --   --   --   --   --   --   --    ALT 14  --   --   --   --   --   --   --    ALKPHOS 85  --   --   --   --   --   --   --    BILITOT 0.35  --   --   --   --   --   --   --    POCGLU  --    < > 154* 178* 163* 150* 126* 213*    < > = values in this interval not displayed.      ABG:No results found for: POCPH, PHART, PH, POCPCO2, TYX5GMW, PCO2, POCPO2, PO2ART, PO2, POCHCO3, IHV2AVA, HCO3, NBEA, PBEA, BEART, BE, THGBART, THB, oriented to person, place and time and normal affect  Neck: L neck paraspinal and shoulder TTP  Lungs:  clear to auscultation bilaterally, normal effort  Heart:  regular rate and rhythm, no murmur  Abdomen:  soft, still with right CVA tenderness going to the right lower quadrant as well [improved from yesterday], nondistended, normal bowel sounds, no masses, hepatomegaly, splenomegaly  Extremities:  no edema, redness, tenderness in the calves  Skin:  no gross lesions, rashes, induration    Assessment:        Hospital Problems           Last Modified POA    * (Principal) Kidney abscess 1/12/2020 Yes    Type 2 diabetes mellitus without complication, without long-term current use of insulin ([address] 1/11/2020 Yes    Ex-smoker 1/11/2020 Yes    Pyelonephritis 1/11/2020 Yes    Iron deficiency anemia 1/11/2020 Yes    Hepatomegaly 1/12/2020 Yes          Plan:        IV Abx, plan for Rocephin for 2-3 weeks per ID   Hydration  Pain control  Urology eval is pending, ok for DC and F/U as OP   ID eval is pending  Anemia, iron deficiency, iron supplementation  BP control  Glycemic control, lantus , sliding scale  Thrombocytosis is better on repeat labs   Hypoalbuminemia might be related to malnutrition from recent illness  Voltaren gel      Addendum: Difficulty in patient transportation, case management still working on it  If we are unable to arrange for SNF the ride to the infusion center will be long but will be our only option at this point      Accepted  Med rec done  Scripts added   AMRIT signed  30+ minutes spent        Roann Riedel, MD  1/14/2020  11:21 AM

## 2020-01-14 NOTE — CARE COORDINATION
Care Transition  Received call from [facility] admissions and they are unable to accept pending Medicaid. Called [facility] in [place] they are still reviewing case. 1145 called [facility] again they still have not reviewed her case. Met with Jody Lopes liaison for [facility] she will have to see if facility will accept her. LOC faxed to [facility]. LOC denied initially due to being independent. Based on daughters word she is not independent, she has to be dressed at home. Daughter has to dress her and give her medication or she will take multiple doses at a time. Updated LOC and refaxed to [facility]. Received call from Yuliana Mayen she is approved for LOC. Rubén Alfaro at [facility]. Faxed AMRIT, LOC, AVS to [facility]. Nurse to call report. Called daughter and left vm to return writers call with cb number. Daughter returned call and updated on discharge plan. Discharge 1 [facility] Case Management Department  Written by: Alia Renee RN    Patient Name: Mauriziohirupali Hindu  Attending Provider: Mor Evans MD  Admit Date: 2020 10:56 AM  MRN: [id number]  Account: [de-identified]                     : 1965  Discharge Date:       Disposition: [facility].      Alia Renee RN

## 2020-01-17 LAB
CULTURE: NORMAL
CULTURE: NORMAL
Lab: NORMAL
Lab: NORMAL
SPECIMEN DESCRIPTION: NORMAL
SPECIMEN DESCRIPTION: NORMAL

## 2020-01-23 NOTE — CONSULTS
[facility] - Initial Consult Note  Today's Date and Time: 1/12/2020, 1:02 PM    Impression :   · Rt side pyelonephritis  · Possible Rt kidney intraparenchymal abscesses    Recommendations:   · Ceftriaxone 1 gm IV q 24 hr for another 2-3 weeks  · Monitor progression of Rt kidney by repeat CT in 2 weeks  · Will discuss with Urology. I don't see a single abscess or a perinephric abscess that could be drained. I fear that surgical intervention would require partial or total nephrectomy. Medical Decision Making/Summary/Discussion:1/12/2020     ·   Infection Control Recommendations   · [place] Precautions    Antimicrobial Stewardship Recommendations     · Targeted therapy  Coordination of Outpatient Care:   · Estimated Length of IV antimicrobials:2-3 weeks  · Patient will need Midline Catheter Insertion: Yes  · Patient will need PICC line Insertion:No  · Patient will need: Home IV , [place],  SNF,  LTAC:Yes  · Patient will need outpatient wound care:No    Chief complaint/reason for consultation:   · Complicated Rt pyelonephritis      History of Present Illness:   Alcon Campbell is a 62y.o.-year-old  female who was initially admitted on 1/11/2020. Patient seen at the request of Shemar Stacy. INITIAL HISTORY:    Patient initially presented to [facility] ER with increase in pain in the right flank, left shoulder,neck and head. She had previously been seen in ER there on 12/17/2019 because of SOB and Rt side flank pain presumably from a kidney stone. There was concern for possible septic shock, pyelonephritis, acute renal failure and perinephric abcsess. Patient was transferred to [facility].   Her urine and blood cultures on 12-17-19 x 2 yielded E coli susceptible to ampicillin. She was treated at [facility] from 12-18 through 12-24-19 for pyelonephritis, perinephric abscess, DM 2, E coli septicemia.  She received ceftriaxone and was discharged on Bactrim through 1-120. She was evaluated by Dr Dave Lake for ID. Urology evaluation was also obtained. They indicated that the patient was not a good candidate for drainage of the Rt renal abscesses. Apparently was seen again on 12/26 not feeling well and was transferred to [facility] where she was treated until 12-31-19. This time she was treated with Zosyn. IR felt that the renal abscess was amenable to IR drainage. Dr Juan Daniel Contreras of ID saw her. The patient was discharged on po Augmentin through 1-16-20. She had cardiac work up for Lt chest pain, shoulder pain. Patient returned to ER in [place] on 1-10-20  with chills at night, increase in pain, shortness of breath of one day duration. She was unable to ambulate into the hospital and needed a wheel chair. Her daughter was also concerned that patient was more confused. Patient was transferred to [facility] at that time. CURRENT EVALUATION : 1/12/2020    Afebrile  VS stable    Patient is feeling better. She denies urgency, dysuria, hematuria    She went to ER because of intermittent pain in the Rt flank, occasional fevers and felling cold. CT from [address] [facility] 1-10-20 reviewed and compared to CT 12-26-19. There is swelling of Rt kidney which has decreased compared to 12-26-19 scan. I don't appreciate a perinephric abscess, rather she seems to have several intraparenchymal areas with altered attenuation indicative of residual inflammation/ possibly intraparenchymal abscesses. In comparing the films there is no evolution towards formation of a single abscess. Rather there is improvement in the overall degree of swelling. Will discuss with Urology. Labs, X rays reviewed: 1/12/2020    BUN: 4  Cr:0.51    WBC: 6.9  Hb:8.7  Plat: 379    Cultures:  Urine:  ·   Blood:  · 1-11-20: No growth    Sputum :  ·   Wound:  ·     Discussed with patient, RN, family. CT from [address]- 1-10-20 Reviewed. There is swelling of Rt kidney which has decreased compared to 12-26-19 scan. Used   Substance and Sexual Activity    Alcohol use: Not on file    Drug use: Not on file    Sexual activity: Not on file   Lifestyle    Physical activity:     Days per week: Not on file     Minutes per session: Not on file    Stress: Not on file   Relationships    Social connections:     Talks on phone: Not on file     Gets together: Not on file     Attends Latter-day service: Not on file     Active member of club or organization: Not on file     Attends meetings of clubs or organizations: Not on file     Relationship status: Not on file    Intimate partner violence:     Fear of current or ex partner: Not on file     Emotionally abused: Not on file     Physically abused: Not on file     Forced sexual activity: Not on file   Other Topics Concern    Not on file   Social History Narrative    Not on file       Family History:   History reviewed. No pertinent family history. Allergies:   Patient has no known allergies. Review of Systems:   Constitutional: No fevers or chills. No systemic complaints  Head: No headaches  Eyes: No double vision or blurry vision. No conjunctival inflammation. ENT: No sore throat or runny nose. . No hearing loss, tinnitus or vertigo. Cardiovascular: No chest pain or palpitations. No shortness of breath. No ROBERTO  Lung: No shortness of breath or cough. No sputum production  Abdomen: No nausea, vomiting, diarrhea, or abdominal pain. Lowell Meres No cramps. Genitourinary: No increased urinary frequency, or dysuria. No hematuria. No suprapubic. Intermittent Rt CVA pain  Musculoskeletal: No muscle aches or pains. No joint effusions, swelling or deformities  Hematologic: No bleeding or bruising. Neurologic: No headache, weakness, numbness, or tingling. Integument: No rash, no ulcers. Psychiatric: No depression. Endocrine: No polyuria, no polydipsia, no polyphagia.     Physical Examination :     Patient Vitals for the past 8 hrs:   BP Temp Temp src Pulse Resp SpO2   01/12/20 1138 (!) 115/49 Ketan Colbert ()  Orthopaedic Surgery  [address] [place] [address]  Phone: [phone]  Fax: [phone]  Follow Up Time:     Marcos Greenwood)  Cardiology; Cardiovascular Disease; Nuclear Cardiology  [address] [place] [address]  Phone: [phone]  Fax: [phone]  Follow Up Time:

## 2020-02-07 ENCOUNTER — HOSPITAL ENCOUNTER (OUTPATIENT)
Dept: CT IMAGING | Age: 55
Discharge: HOME | End: 2020-02-09
Payer: MEDICARE

## 2020-02-07 PROCEDURE — 74177 CT ABD & PELVIS W/CONTRAST: CPT

## 2020-02-07 PROCEDURE — 6360000004 HC RX CONTRAST MEDICATION: Performed by: INTERNAL MEDICINE

## 2020-02-07 PROCEDURE — 74178 CT ABD&PLV WO CNTR FLWD CNTR: CPT

## 2020-02-07 RX ADMIN — IOPAMIDOL 75 ML: 755 INJECTION, SOLUTION INTRAVENOUS at 12:46

## 2020-02-07 NOTE — PROGRESS NOTES
 Highest education level: Not on file   Occupational History    Not on file   Social Needs    Financial resource strain: Not on file    Food insecurity:     Worry: Not on file     Inability: Not on file    Transportation needs:     Medical: Not on file     Non-medical: Not on file   Tobacco Use    Smoking status: Former Smoker     Types: Cigarettes     Last attempt to quit: 1/11/2010     Years since quitting: 10.0    Smokeless tobacco: Never Used   Substance and Sexual Activity    Alcohol use: Not on file    Drug use: Not on file    Sexual activity: Not on file   Lifestyle    Physical activity:     Days per week: Not on file     Minutes per session: Not on file    Stress: Not on file   Relationships    Social connections:     Talks on phone: Not on file     Gets together: Not on file     Attends Orthodoxy service: Not on file     Active member of club or organization: Not on file     Attends meetings of clubs or organizations: Not on file     Relationship status: Not on file    Intimate partner violence:     Fear of current or ex partner: Not on file     Emotionally abused: Not on file     Physically abused: Not on file     Forced sexual activity: Not on file   Other Topics Concern    Not on file   Social History Narrative    Not on file       Family History:   History reviewed. No pertinent family history. Allergies:   Patient has no known allergies. Review of Systems:   Constitutional: No fevers or chills. No systemic complaints  Head: No headaches  Eyes: No double vision or blurry vision. ENT: No sore throat or runny nose. . No hearing loss, tinnitus or vertigo. Cardiovascular: No chest pain or palpitations. No shortness of breath. No ROBERTO  Lung: No shortness of breath or cough. No sputum production  Abdomen: No nausea, vomiting, diarrhea, or abdominal pain. .  Genitourinary: No increased urinary frequency, or dysuria. No hematuria.  No suprapubic or CVA pain  Musculoskeletal: No muscle aches or pains. No joint effusions, swelling or deformities  Hematologic: No bleeding or bruising. Neurologic: No headache, weakness, numbness, or tingling. Physical Examination :   /72 (Site: Left Upper Arm, Position: Sitting, Cuff Size: Medium Adult)   Pulse 97   Resp 20   Ht 5' 3\" (1.6 m)   Wt 232 lb 6.4 oz (105.4 kg)   SpO2 98% Comment: room air at rest  BMI 41.17 kg/m²    General Appearance: Awake, alert, and in no apparent distress  Head:  Normocephalic, no trauma  Eyes: Pupils equal, round, reactive, to light and accommodation; extraocular movements intact; sclera anicteric; conjunctivae pink. No embolic phenomena. ENT: Oropharynx clear, without erythema, exudate, or thrush. No tenderness of sinuses. Mouth/throat: mucosa pink and moist. No lesions. Dentition in good repair. Neck:Supple, without lymphadenopathy. Thyroid normal, No bruits. Pulmonary/Chest: Clear to auscultation, without wheezes, rales, or rhonchi. No dullness to percussion. Cardiovascular: Regular rate and rhythm without murmurs, rubs, or gallops. Abdomen: Soft, non tender. Bowel sounds normal. No organomegaly  All four Extremities: No cyanosis, clubbing, edema, or effusions. Neurologic: No gross sensory or motor deficits. Skin: Warm and dry with good turgor. No signs of peripheral arterial or venous insufficiency.     Medical Decision Making:   I have independently reviewed/ordered the following labs:    CBC with Differential:   Lab Results   Component Value Date    WBC 6.7 01/14/2020    WBC 6.5 01/13/2020    HGB 8.5 01/14/2020    HGB 8.4 01/13/2020    HCT 28.8 01/14/2020    HCT 28.6 01/13/2020     01/14/2020     01/13/2020    LYMPHOPCT 30 01/14/2020    LYMPHOPCT 32 01/13/2020    MONOPCT 9 01/14/2020    MONOPCT 8 01/13/2020     BMP:   Lab Results   Component Value Date     01/14/2020     01/11/2020    K 3.9 01/14/2020    K 3.9 01/11/2020    CL 94 01/14/2020    CL 97 01/11/2020    CO2 26

## 2020-02-11 ENCOUNTER — OFFICE VISIT (OUTPATIENT)
Dept: INFECTIOUS DISEASES | Age: 55
End: 2020-02-11
Payer: MEDICARE

## 2020-02-11 VITALS
DIASTOLIC BLOOD PRESSURE: 72 MMHG | OXYGEN SATURATION: 98 % | BODY MASS INDEX: 41.18 KG/M2 | WEIGHT: 232.4 LBS | HEIGHT: 63 IN | SYSTOLIC BLOOD PRESSURE: 130 MMHG | HEART RATE: 97 BPM | RESPIRATION RATE: 20 BRPM

## 2020-02-11 PROCEDURE — 3017F COLORECTAL CA SCREEN DOC REV: CPT | Performed by: INTERNAL MEDICINE

## 2020-02-11 PROCEDURE — 99214 OFFICE O/P EST MOD 30 MIN: CPT | Performed by: INTERNAL MEDICINE

## 2020-02-11 PROCEDURE — 1036F TOBACCO NON-USER: CPT | Performed by: INTERNAL MEDICINE

## 2020-02-11 PROCEDURE — G8482 FLU IMMUNIZE ORDER/ADMIN: HCPCS | Performed by: INTERNAL MEDICINE

## 2020-02-11 PROCEDURE — 3046F HEMOGLOBIN A1C LEVEL >9.0%: CPT | Performed by: INTERNAL MEDICINE

## 2020-02-11 PROCEDURE — G8419 CALC BMI OUT NRM PARAM NOF/U: HCPCS | Performed by: INTERNAL MEDICINE

## 2020-02-11 PROCEDURE — G8427 DOCREV CUR MEDS BY ELIG CLIN: HCPCS | Performed by: INTERNAL MEDICINE

## 2020-02-11 PROCEDURE — 1111F DSCHRG MED/CURRENT MED MERGE: CPT | Performed by: INTERNAL MEDICINE

## 2020-02-11 PROCEDURE — 2022F DILAT RTA XM EVC RTNOPTHY: CPT | Performed by: INTERNAL MEDICINE

## 2020-02-11 NOTE — LETTER
[facility] [address]  Phone: [phone]  Fax: [phone]    PCP: Alecia Montana DO   CC providers:  Alecia Montana, [address] [address]  VIA Mail     Sulema Bliss MD  [address] [id number]  VIA Mail       February 11, 2020       Patient: Stanislaw Marin   MR Number: [id number]   YOB: 1965   Date of Visit: 2/11/2020     Dear Jayce Howard: Thank you for allowing me to see your patient Ms. Maryam Muse. Below are the relevant portions of my assessment and plan of care. [facility] - Office Progress Note  Today's Date and Time: 2/11/2020, 2:12 PM    Diagnostic Impression :     1. Pyelonephritis    2. Kidney abscess    3. Type 2 diabetes mellitus without complication, without long-term current use of insulin ([address]    4. Hepatomegaly    5. Thrombocytosis ([address]      6. Pyelonephritis and renal abscess resolved as of 2-11-20  Recommendations ·   Monitor off antibiotics  · Ceftriaxone completed 2-2-20  · Repeat CT abd/pelvis completed 2-7-20  · Office f/up PRN  · ID wise OK to return to work at the discretion of Dr Echo Madsen. Chief complaint/reason for consultation:     Chief Complaint   Patient presents with    Follow-Up from Hospital     Pyelonephritis       History of Present Illness:   Stanislaw Marin is a 62y.o.-year-old  female who was evaluated on 2/11/2020. Patient seen at the request of Dr. Echo Madsen.     INITIAL HISTORY:     Patient initially presented to [facility] ER with increase in pain in the right flank, left shoulder,neck and head. Transferred to [facility] on 1-11-20     She had previously been seen in ER there on 12/17/2019 because of SOB and Rt side flank pain presumably from a kidney stone.  There was concern for possible septic shock, pyelonephritis, acute renal failure and perinephric a follow up CT done because of her lack of insurance. She states that she is willing to go to a SNF for the duration of the antibiotic therapy and to improve her strength. She improved with the Ceftriaxone and was discharged home with Ceftriaxone through 2-2-20. CURRENT EVALUATION : 2/11/2020    Patient feels much better. She denies any fevers, chills, night sweats, dysuria, frequency or CVA pain. Reports that she is recovering her pep and energy progressively although she still tires out easily. She is off work recovering and will see Dr Cynthia Christianson before two weeks to obtain permission to go back to work. A follow up CT was completed on 2-7-20 and showed:  Findings consistent with right pyelonephritis and possible tiny micro abscess   component.  No drainable abscess collection is identified.       Prominent aortocaval lymph node likely reactive in nature     The Ct was compared with a prior CT from [facility] [address] obtained on 1-11-20. Patient was shown both films. The initial CT showed Rt kidney edema, perinephric stranding and some degree of hydronephrosis. The Ct from 2-7-20 shows minimal residual findings of stranding. No definite intra renal or perinephric abscess was noted. DISCUSSION:  · 61 yo woman with in initial Rt flank pain  · Initially diagnosed and treated at [address] [place] [address] for kidney stones. · Found to have E. coli bacteremia and UTI treated with ceftriaxone and then Bactrim. · Also found to have pyelonephritis and possible perinephric abscess. · Discharged home, and returned to the hospital in 1 day. · Treated with Zosyn, and underwent IR drainage of a renal abscess. · Discharged home on oral Augmentin through 1/16/2020  · Returned to the ER in [place] on 1-10-20 with increased pain and chills  · Found to have multiple small perinephric abscesses. This time she was transferred to [facility]. · Placed on ceftriaxone through February 2.  · Has responded well to treatment.   ferrous sulfate 325 (65 Fe) MG tablet, Take 325 mg by mouth, Disp: , Rfl:     Multiple Vitamins-Minerals (THERAPEUTIC MULTIVITAMIN-MINERALS) tablet, Take 1 tablet by mouth, Disp: , Rfl:     aspirin (ASPIRIN 81) 81 MG chewable tablet, Take 81 mg by mouth, Disp: , Rfl:     amitriptyline (ELAVIL) 25 MG tablet, Take 25 mg by mouth, Disp: , Rfl:     Semaglutide,0.25 or 0.5MG/DOS, 2 MG/1.5ML SOPN, Inject 0.25 mg into the skin, Disp: , Rfl:     Ascorbic Acid 250 MG CHEW, Take 2 tablets by mouth, Disp: , Rfl:      Social History:     Social History     Socioeconomic History    Marital status: Unknown     Spouse name: Not on file    Number of children: Not on file    Years of education: Not on file    Highest education level: Not on file   Occupational History    Not on file   Social Needs    Financial resource strain: Not on file    Food insecurity:     Worry: Not on file     Inability: Not on file    Transportation needs:     Medical: Not on file     Non-medical: Not on file   Tobacco Use    Smoking status: Former Smoker     Types: Cigarettes     Last attempt to quit: 1/11/2010     Years since quitting: 10.0    Smokeless tobacco: Never Used   Substance and Sexual Activity    Alcohol use: Not on file    Drug use: Not on file    Sexual activity: Not on file   Lifestyle    Physical activity:     Days per week: Not on file     Minutes per session: Not on file    Stress: Not on file   Relationships    Social connections:     Talks on phone: Not on file     Gets together: Not on file     Attends [employer] service: Not on file     Active member of club or organization: Not on file     Attends meetings of clubs or organizations: Not on file     Relationship status: Not on file    Intimate partner violence:     Fear of current or ex partner: Not on file     Emotionally abused: Not on file     Physically abused: Not on file     Forced sexual activity: Not on file   Other Topics Concern    Not on file Social History Narrative    Not on file       Family History:   History reviewed. No pertinent family history. Allergies:   Patient has no known allergies. Review of Systems:   Constitutional: No fevers or chills. No systemic complaints  Head: No headaches  Eyes: No double vision or blurry vision. ENT: No sore throat or runny nose. . No hearing loss, tinnitus or vertigo. Cardiovascular: No chest pain or palpitations. No shortness of breath. No ROBERTO  Lung: No shortness of breath or cough. No sputum production  Abdomen: No nausea, vomiting, diarrhea, or abdominal pain. .  Genitourinary: No increased urinary frequency, or dysuria. No hematuria. No suprapubic or CVA pain  Musculoskeletal: No muscle aches or pains. No joint effusions, swelling or deformities  Hematologic: No bleeding or bruising. Neurologic: No headache, weakness, numbness, or tingling. Physical Examination :   /72 (Site: Left Upper Arm, Position: Sitting, Cuff Size: Medium Adult)   Pulse 97   Resp 20   Ht 5' 3\" (1.6 m)   Wt 232 lb 6.4 oz (105.4 kg)   SpO2 98% Comment: room air at rest  BMI 41.17 kg/m²     General Appearance: Awake, alert, and in no apparent distress  Head:  Normocephalic, no trauma  Eyes: Pupils equal, round, reactive, to light and accommodation; extraocular movements intact; sclera anicteric; conjunctivae pink. No embolic phenomena. ENT: Oropharynx clear, without erythema, exudate, or thrush. No tenderness of sinuses. Mouth/throat: mucosa pink and moist. No lesions. Dentition in good repair. Neck:Supple, without lymphadenopathy. Thyroid normal, No bruits. Pulmonary/Chest: Clear to auscultation, without wheezes, rales, or rhonchi. No dullness to percussion. Cardiovascular: Regular rate and rhythm without murmurs, rubs, or gallops. Abdomen: Soft, non tender. Bowel sounds normal. No organomegaly  All four Extremities: No cyanosis, clubbing, edema, or effusions. Neurologic: No gross sensory or motor deficits. Skin: Warm and dry with good turgor. No signs of peripheral arterial or venous insufficiency. Medical Decision Making:   I have independently reviewed/ordered the following labs:    CBC with Differential:   Lab Results   Component Value Date    WBC 6.7 01/14/2020    WBC 6.5 01/13/2020    HGB 8.5 01/14/2020    HGB 8.4 01/13/2020    HCT 28.8 01/14/2020    HCT 28.6 01/13/2020     01/14/2020     01/13/2020    LYMPHOPCT 30 01/14/2020    LYMPHOPCT 32 01/13/2020    MONOPCT 9 01/14/2020    MONOPCT 8 01/13/2020     BMP:   Lab Results   Component Value Date     01/14/2020     01/11/2020    K 3.9 01/14/2020    K 3.9 01/11/2020    CL 94 01/14/2020    CL 97 01/11/2020    CO2 26 01/14/2020    CO2 27 01/11/2020    BUN 5 01/14/2020    BUN 4 01/11/2020    CREATININE 0.61 01/14/2020    CREATININE 0.51 01/11/2020    MG 1.6 01/11/2020     Hepatic Function Panel:   Lab Results   Component Value Date    PROT 7.9 01/11/2020    LABALBU 2.9 01/11/2020    BILITOT 0.35 01/11/2020    ALKPHOS 85 01/11/2020    ALT 14 01/11/2020    AST 13 01/11/2020     No results found for: RPR  No results found for: HIV  No results found for: [facility]  Lab Results   Component Value Date    RBC 3.34 01/14/2020    WBC 6.7 01/14/2020     Lab Results   Component Value Date    CREATININE 0.61 01/14/2020    GLUCOSE 234 01/14/2020       Thank you for allowing us to participate in the care of this patient. Please call with questions. Alex Brown MD  Pager: [phone] - Office: [phone]        If you have questions, please do not hesitate to call me. I look forward to following Virgen Frye along with you.     Sincerely,        Alex Brown MD